# Patient Record
Sex: FEMALE | Race: WHITE | NOT HISPANIC OR LATINO | Employment: OTHER | URBAN - METROPOLITAN AREA
[De-identification: names, ages, dates, MRNs, and addresses within clinical notes are randomized per-mention and may not be internally consistent; named-entity substitution may affect disease eponyms.]

---

## 2023-04-26 LAB — URINE CULTURE: ABNORMAL

## 2023-04-28 LAB — URINE CULTURE: NORMAL

## 2023-05-02 LAB — URINE CULTURE: NORMAL

## 2023-07-26 LAB — URINE CULTURE: NORMAL

## 2023-10-31 ENCOUNTER — DOCUMENTATION (OUTPATIENT)
Dept: ORTHOPEDIC SURGERY | Facility: CLINIC | Age: 78
End: 2023-10-31
Payer: MEDICARE

## 2023-11-01 ENCOUNTER — OFFICE VISIT (OUTPATIENT)
Dept: ORTHOPEDIC SURGERY | Facility: CLINIC | Age: 78
End: 2023-11-01
Payer: MEDICARE

## 2023-11-01 VITALS — WEIGHT: 172 LBS | BODY MASS INDEX: 32.47 KG/M2 | HEIGHT: 61 IN

## 2023-11-01 DIAGNOSIS — S80.01XA CONTUSION OF RIGHT KNEE AND LOWER LEG, INITIAL ENCOUNTER: Primary | ICD-10-CM

## 2023-11-01 DIAGNOSIS — Z96.651 HISTORY OF TOTAL RIGHT KNEE REPLACEMENT: ICD-10-CM

## 2023-11-01 DIAGNOSIS — S80.11XA CONTUSION OF RIGHT KNEE AND LOWER LEG, INITIAL ENCOUNTER: Primary | ICD-10-CM

## 2023-11-01 PROCEDURE — 1159F MED LIST DOCD IN RCRD: CPT | Performed by: ORTHOPAEDIC SURGERY

## 2023-11-01 PROCEDURE — 1036F TOBACCO NON-USER: CPT | Performed by: ORTHOPAEDIC SURGERY

## 2023-11-01 PROCEDURE — 99213 OFFICE O/P EST LOW 20 MIN: CPT | Performed by: ORTHOPAEDIC SURGERY

## 2023-11-01 PROCEDURE — 1160F RVW MEDS BY RX/DR IN RCRD: CPT | Performed by: ORTHOPAEDIC SURGERY

## 2023-11-01 PROCEDURE — 1126F AMNT PAIN NOTED NONE PRSNT: CPT | Performed by: ORTHOPAEDIC SURGERY

## 2023-11-01 RX ORDER — AMOXICILLIN 500 MG/1
2000 TABLET, FILM COATED ORAL ONCE
Status: CANCELLED | OUTPATIENT
Start: 2023-11-01 | End: 2023-11-01

## 2023-11-01 ASSESSMENT — ENCOUNTER SYMPTOMS: KNEE SWELLING: 1

## 2023-11-01 NOTE — PROGRESS NOTES
Subjective    Patient ID: Leni Graham is a 78 y.o. female.    Chief Complaint: Pain of the Right Knee       78-year-old female who is a few years status post a right TKA.  The patient arrives today for evaluation of right knee pain as result of an injury that occurred on September 8, 2023 when she had a fall.  She feels she bumped her knee and noted some swelling and bruising which has now resolved.  Her main complaint now is actually some right shin and leg pain.  She does have a significant history of lumbar spine disease and is under the care of pain management.  She has had x-rays performed of her right knee on September 8 demonstrating the total knee arthroplasty remains well-positioned without evidence of component loosening.  I reviewed those films myself    This patient's past medical, social, and family history were reviewed as well as a review of systems including updates on the patient's information encounter sheet    Physical Examination  Constitutional: Patient's height and weight reviewed, appears well kempt  Psychiatric: Alert and oriented ×3, appropriate mood and behavior  Pulmonary: Breathing appears nonlabored, no apparent distress  Lymphatic: No appreciable lymphadenopathy to both the upper and lower extremities  Skin: No open lesions, rashes, ulcerations  Neurologic: Gross motor and sensory exam appear intact (except for abnormalities noted in the below muscle skeletal exam)    Musculoskeletal: There appears to be satisfactory range of motion of the right hip without groin or thigh pain elicited.  The right knee reveals no effusion or erythema or warmth.  The incision previously performed is well-healed.  There is no abrasion identified.  No ecchymosis identified.  Full active extension.  Flexion past 115 degrees.  Satisfactory patellofemoral tracking.  Satisfactory stability to varus and valgus stresses.    Assessment: Recent fall likely aggravating pre-existing lumbar spine disease    Plan:  Patient is going to continue with conservative management including observation and rest.  If she does not see improvement she may consider returning to pain management for reevaluation.    Right Knee         No current outpatient medications on file.

## 2023-11-02 ENCOUNTER — HOSPITAL ENCOUNTER (OUTPATIENT)
Dept: RADIOLOGY | Facility: EXTERNAL LOCATION | Age: 78
Discharge: HOME | End: 2023-11-02
Payer: MEDICARE

## 2023-12-04 ENCOUNTER — OFFICE VISIT (OUTPATIENT)
Dept: UROLOGY | Facility: CLINIC | Age: 78
End: 2023-12-04
Payer: MEDICARE

## 2023-12-04 VITALS — DIASTOLIC BLOOD PRESSURE: 70 MMHG | HEART RATE: 71 BPM | SYSTOLIC BLOOD PRESSURE: 151 MMHG

## 2023-12-04 DIAGNOSIS — N39.41 URGE URINARY INCONTINENCE: ICD-10-CM

## 2023-12-04 DIAGNOSIS — N95.2 VAGINAL ATROPHY: ICD-10-CM

## 2023-12-04 DIAGNOSIS — Z96.0 PRESENCE OF PESSARY: ICD-10-CM

## 2023-12-04 DIAGNOSIS — Z46.89 PESSARY MAINTENANCE: Primary | ICD-10-CM

## 2023-12-04 PROCEDURE — 99214 OFFICE O/P EST MOD 30 MIN: CPT | Performed by: OBSTETRICS & GYNECOLOGY

## 2023-12-04 PROCEDURE — 1036F TOBACCO NON-USER: CPT | Performed by: OBSTETRICS & GYNECOLOGY

## 2023-12-04 PROCEDURE — 99214 OFFICE O/P EST MOD 30 MIN: CPT | Mod: PN | Performed by: OBSTETRICS & GYNECOLOGY

## 2023-12-04 PROCEDURE — 1160F RVW MEDS BY RX/DR IN RCRD: CPT | Performed by: OBSTETRICS & GYNECOLOGY

## 2023-12-04 PROCEDURE — 1159F MED LIST DOCD IN RCRD: CPT | Performed by: OBSTETRICS & GYNECOLOGY

## 2023-12-04 PROCEDURE — 1126F AMNT PAIN NOTED NONE PRSNT: CPT | Performed by: OBSTETRICS & GYNECOLOGY

## 2023-12-04 RX ORDER — ESTRADIOL 0.1 MG/G
CREAM VAGINAL
Qty: 42.5 G | Refills: 3 | Status: SHIPPED | OUTPATIENT
Start: 2023-12-04 | End: 2024-04-16 | Stop reason: SDUPTHER

## 2023-12-04 NOTE — PATIENT INSTRUCTIONS
Please follow-up in March/April 2024 for pessary maintenance.    Please continue to utilize your vaginal estrogen therapy 2-3 times weekly.    Please contact the clinic with any questions or concerns.    [phone]

## 2023-12-04 NOTE — PROGRESS NOTES
Subjective   Patient ID: Leni Graham is a 78 y.o. female who presents for pessary maintenance.  HPI   78-year-old with atrophic vaginitis and uterine prolapse with urge urinary incontinence and #3 ring without support pessary in place.    The patient appears to be doing well, she denies any vaginal complaints, no abnormal bleeding or discharge.  She is utilizing the vaginal estrogen cream.    She denies any significant changes in her LUTS. She denies any UTI like symptoms.    She denies any bowel related complaints, no fecal or flatal incontinence.    She has no other complaints.    From Previous note  76-year-old presenting for follow-up with history of urge urinary incontinence and #3 ring pessary in place.     The patient feels very well controlled from a urinary urgency and urge related incontinence standpoint utilizing just her pessary. She has not utilized any anticholinergics due to cost and feels overall very well controlled. She notes 1-2 episode of nocturia and denies enuresis. She notes daytime frequency every 2-3 hours.     She does not desire any medical therapy at this time.     She denies any vaginal complaints. She is overall very satisfied with her pessary. She does require refill on her vaginal estrogen therapy.     She has no new complaints.     From previous note:  72-year-old  presenting as a referral from Dr. Celaya for concerns for chronic urinary tract infections.     Patient denies urge or stress urinary incontinence symptoms. She denies enuresis. She notes nocturia 1-2 times at night. She denies leaking urine with laughing coughing and sneezing. She denies dysuria or hematuria.     She has noted over the last 2 months a vaginal bulge when she wipes. She is not sexually active. She denies any vaginal bleeding.     The patient was recently hospitalized for intractable nausea and vomiting. During her hospital course she was noted to have blood and leuk esterase in her urine and was  treated empirically with antibiotics for a presumed urinary tract infection. A careful analysis of the last year of urinalyses and cultures demonstrate trace or negative blood with positive leukocytes and or leuk esterase. She denies any urinary urgency symptoms over the last 2 months and denies any urinary tract infection symptoms during her hospitalization stay.     She is working with Dr. Lubin for a prescription for her nausea complaints. Per her report a thorough workup while hospitalized was negative for these complaints.  Review of Systems  Constitutional: No fever, No chills and No fatigue.   Eyes: No vision problems and No dryness of the eyes.   ENT: No dry mouth, No hearing loss and No nosebleeds.   Cardiovascular: No chest pain, No palpitations and No orthopnea.   Respiratory: No shortness of breath, No cough and No wheezing.   Gastrointestinal: No abdominal pain, No constipation, No nausea, No diarrhea, No vomiting and No melena.   Genitourinary: As noted in HPI.   Musculoskeletal: No back pain, No myalgias, No muscle weakness, No joint swelling and No leg edema.   Integumentary: No rashes, No skin lesion and No itching.   Neurological: No headache, No numbness and No dizziness.   Psychiatric: No sleep disturbances, No anxiety and No depression.   Endocrine: No hot flashes, No loss of hair and No hirsutism.   Hematologic/Lymphatic: No swollen glands, No tendency for easy bleeding and No tendency for easy bruising.   All other systems have been reviewed and are negative for complaint.        Objective   Physical Exam    PHYSICAL EXAMINATION:  No LMP recorded.  There is no height or weight on file to calculate BMI.  /70   Pulse 71   General Appearance: well appearing  Neuro: Alert and oriented   HEENT: mucous membranes moist, neck supple  Resp: No respiratory distress, normal work of breathing  MSK: normal range of motion, gait appropriate    Patient ID: Leni Graham is a 78 y.o.  female.    Procedures  [facility] Procedure:  Pessary check.   Indication (s): pelvic organ prolapse.   Findings include: cystocele, uterine prolapse, atrophy and 3. Ring without support was removed and replaced without complication, but No tenderness and No excoriations.     Assessment/Plan      78-year-old with atrophic vaginitis and uterine prolapse with urge urinary incontinence and #3 ring without support pessary in place.     #1 we again discussed the [facility] care pathway including first, second, and third line therapies. We discussed fluid management strategies as well as benefits of pelvic floor physical therapy. The patient does have good pelvic floor strength and no pain. Trospium is cost prohibitive and she did not attempt this medication. She was previously prescribed solifenacin but did not fill this. She presently feels that she is well controlled from an urgency standpoint and does not desire any medication therapy. We have also previously discussed third line therapies including PTNS, Botox, and InterStim.      #2 she appears to be having excellent results with her pessary and wishes to continue this moving forward.  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 She appears to be having excellent results with her #3 ring without support. The patient will require follow-up in March/April 2024     #3 We discussed the safety, efficacy, proper utilization of vaginal estrogen therapy and she was provided a new prescription for this. She will continue this 3 times a week moving forward.  A new prescription was sent to her mail order pharmacy.     #4 the patient will follow up in March/April 2024 for pessary maintenance.     LIBORIO Rocha MD     Scribe Attestation  By signing my name below, Terra MALDONADO Scribe attest that this documentation has been prepared under the direction and in the presence of John KINNEY  MD Aj. All medical record entries made by the Scribe were at my direction or personally dictated by me. I have reviewed the chart and agree that the record accurately reflects my personal performance of the history, physical exam, discussion and plan.

## 2023-12-05 ENCOUNTER — APPOINTMENT (OUTPATIENT)
Dept: UROLOGY | Facility: CLINIC | Age: 78
End: 2023-12-05
Payer: MEDICARE

## 2023-12-28 ENCOUNTER — OFFICE VISIT (OUTPATIENT)
Dept: ORTHOPEDIC SURGERY | Facility: CLINIC | Age: 78
End: 2023-12-28
Payer: MEDICARE

## 2023-12-28 ENCOUNTER — ANCILLARY PROCEDURE (OUTPATIENT)
Dept: RADIOLOGY | Facility: CLINIC | Age: 78
End: 2023-12-28
Payer: MEDICARE

## 2023-12-28 VITALS — HEIGHT: 61 IN | WEIGHT: 172 LBS | BODY MASS INDEX: 32.47 KG/M2

## 2023-12-28 DIAGNOSIS — M25.512 ACUTE PAIN OF LEFT SHOULDER: ICD-10-CM

## 2023-12-28 DIAGNOSIS — M54.12 RADICULOPATHY, CERVICAL: Primary | ICD-10-CM

## 2023-12-28 DIAGNOSIS — M25.511 RIGHT SHOULDER PAIN: ICD-10-CM

## 2023-12-28 DIAGNOSIS — M25.511 ACUTE PAIN OF RIGHT SHOULDER: ICD-10-CM

## 2023-12-28 PROCEDURE — 1159F MED LIST DOCD IN RCRD: CPT | Performed by: ORTHOPAEDIC SURGERY

## 2023-12-28 PROCEDURE — 1036F TOBACCO NON-USER: CPT | Performed by: ORTHOPAEDIC SURGERY

## 2023-12-28 PROCEDURE — 73030 X-RAY EXAM OF SHOULDER: CPT | Mod: RIGHT SIDE | Performed by: RADIOLOGY

## 2023-12-28 PROCEDURE — 1126F AMNT PAIN NOTED NONE PRSNT: CPT | Performed by: ORTHOPAEDIC SURGERY

## 2023-12-28 PROCEDURE — 1160F RVW MEDS BY RX/DR IN RCRD: CPT | Performed by: ORTHOPAEDIC SURGERY

## 2023-12-28 PROCEDURE — 99214 OFFICE O/P EST MOD 30 MIN: CPT | Performed by: ORTHOPAEDIC SURGERY

## 2023-12-28 PROCEDURE — 73030 X-RAY EXAM OF SHOULDER: CPT | Mod: RT

## 2023-12-28 RX ORDER — METHYLPREDNISOLONE 4 MG/1
TABLET ORAL
Qty: 21 TABLET | Refills: 0 | Status: SHIPPED | OUTPATIENT
Start: 2023-12-28

## 2023-12-28 NOTE — PROGRESS NOTES
Subjective    Patient ID: Leni Graham is a 78 y.o. female.    Chief Complaint: Pain of the Right Shoulder       78-year-old female presents for evaluation of right shoulder and arm pain ongoing for the past 2 to 3 weeks.  She does describe the onset of this pain in relationship to a fall when she slipped out of a chair.  She has noted more discomfort with arm elevation but also pain in her cervical spine which radiates down her shoulder to include her upper arm and her forearm as well as her hand.  She has not noted much in the way of weakness.    This patient's past medical, social, and family history were reviewed as well as a review of systems including updates on the patient's information encounter sheet  Patient is not diabetic    Physical Examination  Constitutional: Patient's height and weight reviewed, appears well kempt  Psychiatric: Alert and oriented ×3, appropriate mood and behavior  Pulmonary: Breathing appears nonlabored, no apparent distress  Lymphatic: No appreciable lymphadenopathy to both the upper and lower extremities  Skin: No open lesions, rashes, ulcerations  Neurologic: Gross motor and sensory exam appear intact (except for abnormalities noted in the below muscle skeletal exam)    Musculoskeletal: There appears to be satisfactory passive and active range of motion of the right shoulder with only slight pain elicited.  There is full strength of internal rotation and external rotation of the right shoulder as well as abduction.  There is recreation of the pain with extension of the cervical spine and rotation to the left recreating pain down the right arm.    X-rays performed today of the right shoulder reveal the glenohumeral joint is well reduced.  There is no evidence of a fracture.  Moderate osteopenia    Assessment: Symptoms consistent with cervical radiculopathy    Plan: A long discussion ensued with the patient treatment options.  We discussed the options for physical therapy but she  does not wish to consider physical therapy.  We discussed the options for Medrol Dosepak which she would like to try.  If she does not see improvement with this she will consider evaluation either through pain management or spine specialist.    Right Shoulder           Current Outpatient Medications:     estradiol (Estrace) 0.01 % (0.1 mg/gram) vaginal cream, Place a dime sized amount vaginally for 2 weeks then 3 times a week thereafter., Disp: 42.5 g, Rfl: 3

## 2024-03-06 ENCOUNTER — LAB (OUTPATIENT)
Dept: LAB | Facility: LAB | Age: 79
End: 2024-03-06
Payer: MEDICARE

## 2024-03-06 ENCOUNTER — OFFICE VISIT (OUTPATIENT)
Dept: GASTROENTEROLOGY | Facility: CLINIC | Age: 79
End: 2024-03-06
Payer: MEDICARE

## 2024-03-06 VITALS — BODY MASS INDEX: 32.28 KG/M2 | HEART RATE: 71 BPM | WEIGHT: 171 LBS | HEIGHT: 61 IN

## 2024-03-06 DIAGNOSIS — K29.00 ACUTE GASTRITIS WITHOUT HEMORRHAGE, UNSPECIFIED GASTRITIS TYPE: ICD-10-CM

## 2024-03-06 DIAGNOSIS — R11.12 PROJECTILE VOMITING WITH NAUSEA: ICD-10-CM

## 2024-03-06 DIAGNOSIS — K29.00 ACUTE GASTRITIS WITHOUT HEMORRHAGE, UNSPECIFIED GASTRITIS TYPE: Primary | ICD-10-CM

## 2024-03-06 LAB
ALBUMIN SERPL BCP-MCNC: 4.1 G/DL (ref 3.4–5)
ALP SERPL-CCNC: 102 U/L (ref 33–136)
ALT SERPL W P-5'-P-CCNC: 10 U/L (ref 7–45)
ANION GAP SERPL CALC-SCNC: 15 MMOL/L (ref 10–20)
AST SERPL W P-5'-P-CCNC: 15 U/L (ref 9–39)
BASOPHILS # BLD AUTO: 0.05 X10*3/UL (ref 0–0.1)
BASOPHILS NFR BLD AUTO: 0.7 %
BILIRUB SERPL-MCNC: 0.5 MG/DL (ref 0–1.2)
BUN SERPL-MCNC: 12 MG/DL (ref 6–23)
CALCIUM SERPL-MCNC: 9.9 MG/DL (ref 8.6–10.6)
CHLORIDE SERPL-SCNC: 101 MMOL/L (ref 98–107)
CO2 SERPL-SCNC: 28 MMOL/L (ref 21–32)
CREAT SERPL-MCNC: 0.7 MG/DL (ref 0.5–1.05)
EGFRCR SERPLBLD CKD-EPI 2021: 88 ML/MIN/1.73M*2
EOSINOPHIL # BLD AUTO: 0.34 X10*3/UL (ref 0–0.4)
EOSINOPHIL NFR BLD AUTO: 5 %
ERYTHROCYTE [DISTWIDTH] IN BLOOD BY AUTOMATED COUNT: 15.2 % (ref 11.5–14.5)
GLUCOSE SERPL-MCNC: 88 MG/DL (ref 74–99)
HCT VFR BLD AUTO: 42.3 % (ref 36–46)
HGB BLD-MCNC: 13.3 G/DL (ref 12–16)
IMM GRANULOCYTES # BLD AUTO: 0.01 X10*3/UL (ref 0–0.5)
IMM GRANULOCYTES NFR BLD AUTO: 0.1 % (ref 0–0.9)
LIPASE SERPL-CCNC: 43 U/L (ref 9–82)
LYMPHOCYTES # BLD AUTO: 1.99 X10*3/UL (ref 0.8–3)
LYMPHOCYTES NFR BLD AUTO: 29.2 %
MCH RBC QN AUTO: 29.4 PG (ref 26–34)
MCHC RBC AUTO-ENTMCNC: 31.4 G/DL (ref 32–36)
MCV RBC AUTO: 94 FL (ref 80–100)
MONOCYTES # BLD AUTO: 0.42 X10*3/UL (ref 0.05–0.8)
MONOCYTES NFR BLD AUTO: 6.2 %
NEUTROPHILS # BLD AUTO: 4.01 X10*3/UL (ref 1.6–5.5)
NEUTROPHILS NFR BLD AUTO: 58.8 %
NRBC BLD-RTO: 0 /100 WBCS (ref 0–0)
PLATELET # BLD AUTO: 269 X10*3/UL (ref 150–450)
POTASSIUM SERPL-SCNC: 4.6 MMOL/L (ref 3.5–5.3)
PROT SERPL-MCNC: 6.6 G/DL (ref 6.4–8.2)
RBC # BLD AUTO: 4.52 X10*6/UL (ref 4–5.2)
SODIUM SERPL-SCNC: 139 MMOL/L (ref 136–145)
WBC # BLD AUTO: 6.8 X10*3/UL (ref 4.4–11.3)

## 2024-03-06 PROCEDURE — 83690 ASSAY OF LIPASE: CPT

## 2024-03-06 PROCEDURE — 1159F MED LIST DOCD IN RCRD: CPT | Performed by: INTERNAL MEDICINE

## 2024-03-06 PROCEDURE — 1126F AMNT PAIN NOTED NONE PRSNT: CPT | Performed by: INTERNAL MEDICINE

## 2024-03-06 PROCEDURE — 85025 COMPLETE CBC W/AUTO DIFF WBC: CPT

## 2024-03-06 PROCEDURE — 99214 OFFICE O/P EST MOD 30 MIN: CPT | Performed by: INTERNAL MEDICINE

## 2024-03-06 PROCEDURE — 80053 COMPREHEN METABOLIC PANEL: CPT

## 2024-03-06 PROCEDURE — 36415 COLL VENOUS BLD VENIPUNCTURE: CPT

## 2024-03-06 PROCEDURE — 1036F TOBACCO NON-USER: CPT | Performed by: INTERNAL MEDICINE

## 2024-03-06 PROCEDURE — 1157F ADVNC CARE PLAN IN RCRD: CPT | Performed by: INTERNAL MEDICINE

## 2024-03-06 RX ORDER — ASPIRIN 81 MG/1
81 TABLET ORAL DAILY
COMMUNITY

## 2024-03-06 RX ORDER — FLUTICASONE PROPIONATE AND SALMETEROL 100; 50 UG/1; UG/1
1 POWDER RESPIRATORY (INHALATION) 2 TIMES DAILY
COMMUNITY
Start: 2023-11-28

## 2024-03-06 RX ORDER — OMEPRAZOLE 20 MG/1
20 CAPSULE, DELAYED RELEASE ORAL DAILY
COMMUNITY

## 2024-03-06 ASSESSMENT — ENCOUNTER SYMPTOMS
GASTROINTESTINAL NEGATIVE: 1
RESPIRATORY NEGATIVE: 1
CONSTITUTIONAL NEGATIVE: 1
PSYCHIATRIC NEGATIVE: 1
OCCASIONAL FEELINGS OF UNSTEADINESS: 0
DEPRESSION: 0
ALLERGIC/IMMUNOLOGIC NEGATIVE: 1
CARDIOVASCULAR NEGATIVE: 1
HEMATOLOGIC/LYMPHATIC NEGATIVE: 1
EYES NEGATIVE: 1
LOSS OF SENSATION IN FEET: 0
MUSCULOSKELETAL NEGATIVE: 1
ENDOCRINE NEGATIVE: 1
NEUROLOGICAL NEGATIVE: 1

## 2024-03-06 NOTE — ASSESSMENT & PLAN NOTE
The patient is a 78-year-old with a history gastroesophageal reflux and gastritis.  She currently is having some cervical disc disease who has not been taking Motrin regularly.  She was trying to get off her Prilosec.  Last evening she had an episode of nausea and vomiting and has some mild tenderness in her abdomen.  She has had normal bowel movements and tolerated oatmeal this morning.  I wonder could she have actual gastritis and we will do blood work.  I have asked her to increase her Prilosec to 40 mg daily for at least a month.  I will call her with labs when available.   Detail Level: Detailed

## 2024-03-06 NOTE — PROGRESS NOTES
Nausea and vomiting    History of Present Illness:   Leni Graham is a 78 y.o. female with PMHx of lymphocytic colitis and asthma who presents to GI clinic for follow up.  She presents for an acute visit due to 1 episode of nausea and vomiting last evening and dinner.  She denies hematemesis, coffee-ground emesis, black stool or bright red blood per rectum.  Has mild sensitivity in her stomach today.  She did tolerate oatmeal.    She had an endoscopy 2017 showing gastritis.    She had colonoscopy 2015 showing lymphocytic colitis is no steroids.    She has no other complaints today.        Review of Systems  Review of Systems   Constitutional: Negative.    HENT: Negative.     Eyes: Negative.    Respiratory: Negative.     Cardiovascular: Negative.    Gastrointestinal: Negative.    Endocrine: Negative.    Genitourinary: Negative.    Musculoskeletal: Negative.    Skin: Negative.    Allergic/Immunologic: Negative.    Neurological: Negative.    Hematological: Negative.    Psychiatric/Behavioral: Negative.     All other systems reviewed and are negative.      Allergies  Allergies   Allergen Reactions    Acetaminophen Nausea/vomiting    Adhesive Tape-Silicones Unknown    Duloxetine Unknown    Ezetimibe Unknown    Meloxicam Swelling    Nitrofurantoin Unknown    Nitrofurantoin Monohyd/M-Cryst Diarrhea and Nausea/vomiting    Nortriptyline Other and Unknown    Oxcarbazepine Nausea Only, Other and Unknown     Sodium levels dropped    Oxycodone-Acetaminophen Unknown and Nausea/vomiting    Pitavastatin Other and Unknown    Pseudoephedrine Unknown    Simvastatin Other and Unknown    Statins-Hmg-Coa Reductase Inhibitors Other and Unknown    Sulfa (Sulfonamide Antibiotics) GI Upset    Sulfamethoxazole-Trimethoprim Nausea/vomiting    Tramadol GI Upset and Unknown     Nausea, shaking, severe headache    Cephalexin Rash and Unknown     rash    Diphenhydramine Rash and Swelling     Swelling in feet    Levofloxacin Rash        Medications  Current Outpatient Medications   Medication Instructions    aspirin 81 mg, oral, Daily    estradiol (Estrace) 0.01 % (0.1 mg/gram) vaginal cream Place a dime sized amount vaginally for 2 weeks then 3 times a week thereafter.    fluticasone propion-salmeteroL (Advair Diskus) 100-50 mcg/dose diskus inhaler 1 puff, inhalation, 2 times daily    methylPREDNISolone (Medrol Dospak) 4 mg tablets Use as directed by package instructions    omeprazole (PRILOSEC) 20 mg, oral, Daily        Objective   Visit Vitals  Pulse 71      Physical Exam  Constitutional:       Appearance: Normal appearance.   Eyes:      Conjunctiva/sclera: Conjunctivae normal.   Cardiovascular:      Rate and Rhythm: Normal rate and regular rhythm.   Pulmonary:      Effort: Pulmonary effort is normal.      Breath sounds: Normal breath sounds.   Abdominal:      General: Abdomen is flat. Bowel sounds are normal.      Palpations: Abdomen is soft.   Musculoskeletal:         General: Normal range of motion.      Cervical back: Normal range of motion.   Neurological:      Mental Status: She is alert.           Lab Results   Component Value Date    WBC 5.8 04/13/2022    WBC 6.6 11/08/2021    WBC 5.0 09/30/2020    HGB 14.0 04/13/2022    HGB 13.0 11/08/2021    HGB 13.4 09/30/2020    HCT 43.7 04/13/2022    HCT 42.8 11/08/2021    HCT 42.6 09/30/2020     04/13/2022     11/08/2021     09/30/2020     Lab Results   Component Value Date     04/13/2022     11/08/2021     08/16/2021    K 4.6 04/13/2022    K 4.5 11/08/2021    K 4.4 08/16/2021    CL 98 04/13/2022     11/08/2021     08/16/2021    CO2 28 04/13/2022    CO2 26 11/08/2021    CO2 30 08/16/2021    BUN 12 09/29/2022    BUN 17 04/13/2022    BUN 17 11/08/2021    CREATININE 0.71 09/29/2022    CREATININE 0.75 04/13/2022    CREATININE 0.78 11/08/2021    CALCIUM 9.9 04/13/2022    CALCIUM 9.0 11/08/2021    CALCIUM 9.6 08/16/2021    PROT 6.1 (L) 11/08/2021     PROT 6.2 (L) 09/30/2020    PROT 6.0 (L) 05/29/2020    BILITOT 0.4 11/08/2021    BILITOT 0.5 09/30/2020    BILITOT 0.5 05/29/2020    ALKPHOS 94 11/08/2021    ALKPHOS 115 09/30/2020    ALKPHOS 95 05/29/2020    ALT 14 11/08/2021    ALT 10 09/30/2020    ALT 9 05/29/2020    AST 18 11/08/2021    AST 14 09/30/2020    AST 15 05/29/2020    GLUCOSE 88 04/13/2022    GLUCOSE 86 11/08/2021    GLUCOSE 90 08/16/2021           Leni Graham is a 78 y.o. female who presents to GI clinic for nausea and vomiting in the setting of probable gastritis from NSAIDs..    Acute gastritis without hemorrhage  The patient is a 78-year-old with a history gastroesophageal reflux and gastritis.  She currently is having some cervical disc disease who has not been taking Motrin regularly.  She was trying to get off her Prilosec.  Last evening she had an episode of nausea and vomiting and has some mild tenderness in her abdomen.  She has had normal bowel movements and tolerated oatmeal this morning.  I wonder could she have actual gastritis and we will do blood work.  I have asked her to increase her Prilosec to 40 mg daily for at least a month.  I will call her with labs when available.    Projectile vomiting with nausea  See discussion above on nausea and vomiting.         Channing Lewis MD

## 2024-03-07 ENCOUNTER — TELEPHONE (OUTPATIENT)
Dept: GASTROENTEROLOGY | Facility: CLINIC | Age: 79
End: 2024-03-07
Payer: MEDICARE

## 2024-03-07 NOTE — TELEPHONE ENCOUNTER
Reviewed the labs that show no evidence of liver function test abnormality pancreas abnormality or blood counts.  She will continue her current regimen.

## 2024-03-28 PROBLEM — R29.898 WEAKNESS OF BOTH HIPS: Status: ACTIVE | Noted: 2023-09-28

## 2024-03-28 PROBLEM — L57.0 ACTINIC KERATOSIS: Status: ACTIVE | Noted: 2018-12-09

## 2024-03-28 PROBLEM — G92.9 TOXIC ENCEPHALOPATHY: Status: ACTIVE | Noted: 2017-11-16

## 2024-03-28 PROBLEM — S80.10XA CONTUSION OF KNEE AND LOWER LEG: Status: ACTIVE | Noted: 2024-03-28

## 2024-03-28 PROBLEM — K44.9 HIATAL HERNIA: Status: ACTIVE | Noted: 2024-03-28

## 2024-03-28 PROBLEM — L71.9 ROSACEA: Status: ACTIVE | Noted: 2022-06-20

## 2024-03-28 PROBLEM — E55.9 VITAMIN D INSUFFICIENCY: Status: ACTIVE | Noted: 2024-03-28

## 2024-03-28 PROBLEM — H04.129 DRY EYE SYNDROME: Status: ACTIVE | Noted: 2024-03-28

## 2024-03-28 PROBLEM — R32 ENURESIS: Status: ACTIVE | Noted: 2024-03-28

## 2024-03-28 PROBLEM — R06.02 SHORTNESS OF BREATH ON EXERTION: Status: ACTIVE | Noted: 2024-03-28

## 2024-03-28 PROBLEM — L82.1 SEBORRHEIC KERATOSIS: Status: ACTIVE | Noted: 2018-12-09

## 2024-03-28 PROBLEM — R74.01 TRANSAMINITIS: Status: ACTIVE | Noted: 2023-07-12

## 2024-03-28 PROBLEM — K21.9 GASTROESOPHAGEAL REFLUX DISEASE: Status: ACTIVE | Noted: 2018-10-02

## 2024-03-28 PROBLEM — R41.89 SUBJECTIVE MEMORY COMPLAINTS: Status: ACTIVE | Noted: 2024-03-28

## 2024-03-28 PROBLEM — K29.00 ACUTE GASTRITIS: Status: ACTIVE | Noted: 2024-03-06

## 2024-03-28 PROBLEM — R79.89 ELEVATED LFTS: Status: ACTIVE | Noted: 2023-07-04

## 2024-03-28 PROBLEM — N39.0 UTI (URINARY TRACT INFECTION): Status: ACTIVE | Noted: 2023-07-04

## 2024-03-28 PROBLEM — R30.0 DYSURIA: Status: ACTIVE | Noted: 2018-10-02

## 2024-03-28 PROBLEM — Z96.0 VAGINAL PESSARY PRESENT: Status: ACTIVE | Noted: 2024-03-28

## 2024-03-28 PROBLEM — S80.00XA CONTUSION OF KNEE AND LOWER LEG: Status: ACTIVE | Noted: 2024-03-28

## 2024-03-28 PROBLEM — F32.A DEPRESSION: Status: ACTIVE | Noted: 2023-07-04

## 2024-03-28 PROBLEM — H01.134 ECZEMATOUS DERMATITIS OF LEFT UPPER EYELID: Status: ACTIVE | Noted: 2021-10-28

## 2024-03-28 PROBLEM — R26.2 DIFFICULTY WALKING: Status: ACTIVE | Noted: 2023-09-28

## 2024-03-28 PROBLEM — M70.61 TROCHANTERIC BURSITIS OF RIGHT HIP: Status: ACTIVE | Noted: 2024-03-28

## 2024-03-28 PROBLEM — M79.89 SWELLING OF LEFT LOWER EXTREMITY: Status: ACTIVE | Noted: 2024-03-28

## 2024-03-28 PROBLEM — R05.9 COUGH: Status: ACTIVE | Noted: 2024-03-28

## 2024-04-16 ENCOUNTER — OFFICE VISIT (OUTPATIENT)
Dept: UROLOGY | Facility: CLINIC | Age: 79
End: 2024-04-16
Payer: MEDICARE

## 2024-04-16 VITALS
BODY MASS INDEX: 33.86 KG/M2 | DIASTOLIC BLOOD PRESSURE: 71 MMHG | SYSTOLIC BLOOD PRESSURE: 128 MMHG | WEIGHT: 176.3 LBS | HEART RATE: 65 BPM

## 2024-04-16 DIAGNOSIS — Z96.0 PRESENCE OF PESSARY: ICD-10-CM

## 2024-04-16 DIAGNOSIS — N39.41 URGE URINARY INCONTINENCE: ICD-10-CM

## 2024-04-16 DIAGNOSIS — Z46.89 PESSARY MAINTENANCE: ICD-10-CM

## 2024-04-16 DIAGNOSIS — N95.2 VAGINAL ATROPHY: Primary | ICD-10-CM

## 2024-04-16 PROCEDURE — 99214 OFFICE O/P EST MOD 30 MIN: CPT | Performed by: OBSTETRICS & GYNECOLOGY

## 2024-04-16 PROCEDURE — 1160F RVW MEDS BY RX/DR IN RCRD: CPT | Performed by: OBSTETRICS & GYNECOLOGY

## 2024-04-16 PROCEDURE — 1159F MED LIST DOCD IN RCRD: CPT | Performed by: OBSTETRICS & GYNECOLOGY

## 2024-04-16 PROCEDURE — 1036F TOBACCO NON-USER: CPT | Performed by: OBSTETRICS & GYNECOLOGY

## 2024-04-16 PROCEDURE — 1157F ADVNC CARE PLAN IN RCRD: CPT | Performed by: OBSTETRICS & GYNECOLOGY

## 2024-04-16 RX ORDER — ESTRADIOL 0.1 MG/G
CREAM VAGINAL
Qty: 42.5 G | Refills: 3 | Status: SHIPPED | OUTPATIENT
Start: 2024-04-16

## 2024-04-16 NOTE — PATIENT INSTRUCTIONS
Please follow-up in August/September 2024 for pessary maintenance.    Please continue to utilize your vaginal estrogen therapy 2-3 times weekly.    Please contact the clinic with any questions or concerns.    [phone]

## 2024-04-16 NOTE — PROGRESS NOTES
Subjective   Patient ID: Leni Graham is a 78 y.o. female who presents for pessary maintenance.  HPI   78-year-old with atrophic vaginitis and uterine prolapse with urge urinary incontinence and #3 ring without support pessary in place.     The patient appears to be doing well, she denies any vaginal complaints, no abnormal bleeding or discharge.  She is utilizing the vaginal estrogen cream.    She denies any significant changes in her LUTS. She denies any UTI like symptoms.    She denies any bowel related complaints, no fecal or flatal incontinence.    She has no other complaints.    From Previous note   78-year-old with atrophic vaginitis and uterine prolapse with urge urinary incontinence and #3 ring without support pessary in place.    The patient appears to be doing well, she denies any vaginal complaints, no abnormal bleeding or discharge.  She is utilizing the vaginal estrogen cream.    She denies any significant changes in her LUTS. She denies any UTI like symptoms.    She denies any bowel related complaints, no fecal or flatal incontinence.    She has no other complaints.    From Previous note  76-year-old presenting for follow-up with history of urge urinary incontinence and #3 ring pessary in place.     The patient feels very well controlled from a urinary urgency and urge related incontinence standpoint utilizing just her pessary. She has not utilized any anticholinergics due to cost and feels overall very well controlled. She notes 1-2 episode of nocturia and denies enuresis. She notes daytime frequency every 2-3 hours.     She does not desire any medical therapy at this time.     She denies any vaginal complaints. She is overall very satisfied with her pessary. She does require refill on her vaginal estrogen therapy.     She has no new complaints.     From previous note:  72-year-old  presenting as a referral from Dr. Celaya for concerns for chronic urinary tract infections.     Patient denies  urge or stress urinary incontinence symptoms. She denies enuresis. She notes nocturia 1-2 times at night. She denies leaking urine with laughing coughing and sneezing. She denies dysuria or hematuria.     She has noted over the last 2 months a vaginal bulge when she wipes. She is not sexually active. She denies any vaginal bleeding.     The patient was recently hospitalized for intractable nausea and vomiting. During her hospital course she was noted to have blood and leuk esterase in her urine and was treated empirically with antibiotics for a presumed urinary tract infection. A careful analysis of the last year of urinalyses and cultures demonstrate trace or negative blood with positive leukocytes and or leuk esterase. She denies any urinary urgency symptoms over the last 2 months and denies any urinary tract infection symptoms during her hospitalization stay.     She is working with Dr. Lubin for a prescription for her nausea complaints. Per her report a thorough workup while hospitalized was negative for these complaints.  Review of Systems  Constitutional: No fever, No chills and No fatigue.   Eyes: No vision problems and No dryness of the eyes.   ENT: No dry mouth, No hearing loss and No nosebleeds.   Cardiovascular: No chest pain, No palpitations and No orthopnea.   Respiratory: No shortness of breath, No cough and No wheezing.   Gastrointestinal: No abdominal pain, No constipation, No nausea, No diarrhea, No vomiting and No melena.   Genitourinary: As noted in HPI.   Musculoskeletal: No back pain, No myalgias, No muscle weakness, No joint swelling and No leg edema.   Integumentary: No rashes, No skin lesion and No itching.   Neurological: No headache, No numbness and No dizziness.   Psychiatric: No sleep disturbances, No anxiety and No depression.   Endocrine: No hot flashes, No loss of hair and No hirsutism.   Hematologic/Lymphatic: No swollen glands, No tendency for easy bleeding and No tendency for easy  bruising.   All other systems have been reviewed and are negative for complaint.        Objective   Physical Exam    PHYSICAL EXAMINATION:  No LMP recorded.  There is no height or weight on file to calculate BMI.  There were no vitals taken for this visit.  General Appearance: well appearing  Neuro: Alert and oriented   HEENT: mucous membranes moist, neck supple  Resp: No respiratory distress, normal work of breathing  MSK: normal range of motion, gait appropriate    Patient ID: Leni Graham is a 78 y.o. female.    Procedures  [facility] Procedure:  Pessary check.   Indication (s): pelvic organ prolapse.   Findings include: cystocele, uterine prolapse, atrophy and 3. Ring without support was removed and replaced without complication, but No tenderness and No excoriations.     Assessment/Plan      78-year-old with atrophic vaginitis and uterine prolapse with urge urinary incontinence and #3 ring without support pessary in place.     #1 we again discussed the [facility] care pathway including first, second, and third line therapies. We discussed fluid management strategies as well as benefits of pelvic floor physical therapy. The patient does have good pelvic floor strength and no pain. Trospium is cost prohibitive and she did not attempt this medication. She was previously prescribed solifenacin but did not fill this. She presently feels that she is well controlled from an urgency standpoint and does not desire any medication therapy. We have also previously discussed third line therapies including PTNS, Botox, and InterStim.      #2 she appears to be having excellent results with her pessary and wishes to continue this moving forward.  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 She appears to be having excellent results with her #3 ring without support. The patient will require follow-up in August/September 2024     #3 We discussed the  safety, efficacy, proper utilization of vaginal estrogen therapy and she was provided a new prescription for this. She will continue this 3 times a week moving forward.  A new prescription was sent to her mail order pharmacy.     #4 the patient will follow up in August/September 2024 for pessary maintenance.     LIBORIO Rocha MD     Scribe Attestation  By signing my name below, I Terralaron Kwan, Scribe attest that this documentation has been prepared under the direction and in the presence of John Rocha MD. All medical record entries made by the Scribe were at my direction or personally dictated by me. I have reviewed the chart and agree that the record accurately reflects my personal performance of the history, physical exam, discussion and plan.

## 2024-05-16 ENCOUNTER — TELEPHONE (OUTPATIENT)
Dept: GASTROENTEROLOGY | Facility: CLINIC | Age: 79
End: 2024-05-16
Payer: MEDICARE

## 2024-05-16 NOTE — TELEPHONE ENCOUNTER
----- Message from Katina Gregorio MA sent at 5/16/2024  9:28 AM EDT -----  Regarding: Omperazole  Patient says you put her on Omeprazole about a month ago.  Says it effected her memory so she stopped taking it and now is having heartburn again.    [phone]        Patient will try Pepcid AC as she is worried about the proton pump inhibitors is causing memory issues.

## 2024-05-17 ENCOUNTER — APPOINTMENT (OUTPATIENT)
Dept: RADIOLOGY | Facility: HOSPITAL | Age: 79
End: 2024-05-17
Payer: MEDICARE

## 2024-05-17 ENCOUNTER — HOSPITAL ENCOUNTER (EMERGENCY)
Facility: HOSPITAL | Age: 79
Discharge: HOME | End: 2024-05-17
Attending: EMERGENCY MEDICINE
Payer: MEDICARE

## 2024-05-17 ENCOUNTER — APPOINTMENT (OUTPATIENT)
Dept: CARDIOLOGY | Facility: HOSPITAL | Age: 79
End: 2024-05-17
Payer: MEDICARE

## 2024-05-17 VITALS
RESPIRATION RATE: 16 BRPM | HEIGHT: 61 IN | WEIGHT: 175 LBS | HEART RATE: 65 BPM | BODY MASS INDEX: 33.04 KG/M2 | OXYGEN SATURATION: 95 % | SYSTOLIC BLOOD PRESSURE: 137 MMHG | DIASTOLIC BLOOD PRESSURE: 78 MMHG | TEMPERATURE: 97.5 F

## 2024-05-17 DIAGNOSIS — R07.89 ATYPICAL CHEST PAIN: Primary | ICD-10-CM

## 2024-05-17 LAB
ALBUMIN SERPL BCP-MCNC: 3.8 G/DL (ref 3.4–5)
ALP SERPL-CCNC: 97 U/L (ref 33–136)
ALT SERPL W P-5'-P-CCNC: 9 U/L (ref 7–45)
ANION GAP SERPL CALC-SCNC: 13 MMOL/L (ref 10–20)
AST SERPL W P-5'-P-CCNC: 12 U/L (ref 9–39)
BASOPHILS # BLD AUTO: 0.04 X10*3/UL (ref 0–0.1)
BASOPHILS NFR BLD AUTO: 0.6 %
BILIRUB SERPL-MCNC: 0.6 MG/DL (ref 0–1.2)
BUN SERPL-MCNC: 13 MG/DL (ref 6–23)
CALCIUM SERPL-MCNC: 9.3 MG/DL (ref 8.6–10.3)
CARDIAC TROPONIN I PNL SERPL HS: 4 NG/L (ref 0–13)
CARDIAC TROPONIN I PNL SERPL HS: 4 NG/L (ref 0–13)
CHLORIDE SERPL-SCNC: 100 MMOL/L (ref 98–107)
CO2 SERPL-SCNC: 27 MMOL/L (ref 21–32)
CREAT SERPL-MCNC: 0.71 MG/DL (ref 0.5–1.05)
EGFRCR SERPLBLD CKD-EPI 2021: 87 ML/MIN/1.73M*2
EOSINOPHIL # BLD AUTO: 0.27 X10*3/UL (ref 0–0.4)
EOSINOPHIL NFR BLD AUTO: 4.2 %
ERYTHROCYTE [DISTWIDTH] IN BLOOD BY AUTOMATED COUNT: 14.4 % (ref 11.5–14.5)
GLUCOSE SERPL-MCNC: 99 MG/DL (ref 74–99)
HCT VFR BLD AUTO: 38.9 % (ref 36–46)
HGB BLD-MCNC: 12.8 G/DL (ref 12–16)
IMM GRANULOCYTES # BLD AUTO: 0.01 X10*3/UL (ref 0–0.5)
IMM GRANULOCYTES NFR BLD AUTO: 0.2 % (ref 0–0.9)
INR PPP: 1.1 (ref 0.9–1.1)
LYMPHOCYTES # BLD AUTO: 1.51 X10*3/UL (ref 0.8–3)
LYMPHOCYTES NFR BLD AUTO: 23.3 %
MAGNESIUM SERPL-MCNC: 1.91 MG/DL (ref 1.6–2.4)
MCH RBC QN AUTO: 30 PG (ref 26–34)
MCHC RBC AUTO-ENTMCNC: 32.9 G/DL (ref 32–36)
MCV RBC AUTO: 91 FL (ref 80–100)
MONOCYTES # BLD AUTO: 0.4 X10*3/UL (ref 0.05–0.8)
MONOCYTES NFR BLD AUTO: 6.2 %
NEUTROPHILS # BLD AUTO: 4.25 X10*3/UL (ref 1.6–5.5)
NEUTROPHILS NFR BLD AUTO: 65.5 %
NRBC BLD-RTO: 0 /100 WBCS (ref 0–0)
PLATELET # BLD AUTO: 239 X10*3/UL (ref 150–450)
POTASSIUM SERPL-SCNC: 4 MMOL/L (ref 3.5–5.3)
PROT SERPL-MCNC: 6.2 G/DL (ref 6.4–8.2)
PROTHROMBIN TIME: 11.9 SECONDS (ref 9.8–12.8)
RBC # BLD AUTO: 4.27 X10*6/UL (ref 4–5.2)
SODIUM SERPL-SCNC: 136 MMOL/L (ref 136–145)
WBC # BLD AUTO: 6.5 X10*3/UL (ref 4.4–11.3)

## 2024-05-17 PROCEDURE — 85610 PROTHROMBIN TIME: CPT | Performed by: EMERGENCY MEDICINE

## 2024-05-17 PROCEDURE — 71045 X-RAY EXAM CHEST 1 VIEW: CPT | Mod: FOREIGN READ | Performed by: RADIOLOGY

## 2024-05-17 PROCEDURE — 71045 X-RAY EXAM CHEST 1 VIEW: CPT

## 2024-05-17 PROCEDURE — 84075 ASSAY ALKALINE PHOSPHATASE: CPT | Performed by: EMERGENCY MEDICINE

## 2024-05-17 PROCEDURE — 36415 COLL VENOUS BLD VENIPUNCTURE: CPT | Performed by: EMERGENCY MEDICINE

## 2024-05-17 PROCEDURE — 84484 ASSAY OF TROPONIN QUANT: CPT | Performed by: EMERGENCY MEDICINE

## 2024-05-17 PROCEDURE — 85025 COMPLETE CBC W/AUTO DIFF WBC: CPT | Performed by: EMERGENCY MEDICINE

## 2024-05-17 PROCEDURE — 83735 ASSAY OF MAGNESIUM: CPT | Performed by: EMERGENCY MEDICINE

## 2024-05-17 PROCEDURE — 93005 ELECTROCARDIOGRAM TRACING: CPT

## 2024-05-17 PROCEDURE — 99283 EMERGENCY DEPT VISIT LOW MDM: CPT | Mod: 25 | Performed by: EMERGENCY MEDICINE

## 2024-05-17 PROCEDURE — 2500000001 HC RX 250 WO HCPCS SELF ADMINISTERED DRUGS (ALT 637 FOR MEDICARE OP): Performed by: EMERGENCY MEDICINE

## 2024-05-17 RX ORDER — LIDOCAINE HYDROCHLORIDE 20 MG/ML
15 SOLUTION OROPHARYNGEAL ONCE
Status: COMPLETED | OUTPATIENT
Start: 2024-05-17 | End: 2024-05-17

## 2024-05-17 RX ORDER — ALUMINUM HYDROXIDE, MAGNESIUM HYDROXIDE, AND SIMETHICONE 1200; 120; 1200 MG/30ML; MG/30ML; MG/30ML
30 SUSPENSION ORAL ONCE
Status: COMPLETED | OUTPATIENT
Start: 2024-05-17 | End: 2024-05-17

## 2024-05-17 RX ADMIN — ALUMINUM HYDROXIDE, MAGNESIUM HYDROXIDE, AND SIMETHICONE 30 ML: 1200; 120; 1200 SUSPENSION ORAL at 14:49

## 2024-05-17 RX ADMIN — LIDOCAINE HYDROCHLORIDE 15 ML: 20 SOLUTION ORAL at 13:46

## 2024-05-17 ASSESSMENT — LIFESTYLE VARIABLES
EVER FELT BAD OR GUILTY ABOUT YOUR DRINKING: NO
HAVE PEOPLE ANNOYED YOU BY CRITICIZING YOUR DRINKING: NO
TOTAL SCORE: 0
HAVE YOU EVER FELT YOU SHOULD CUT DOWN ON YOUR DRINKING: NO
EVER HAD A DRINK FIRST THING IN THE MORNING TO STEADY YOUR NERVES TO GET RID OF A HANGOVER: NO

## 2024-05-17 ASSESSMENT — COLUMBIA-SUICIDE SEVERITY RATING SCALE - C-SSRS
2. HAVE YOU ACTUALLY HAD ANY THOUGHTS OF KILLING YOURSELF?: NO
1. IN THE PAST MONTH, HAVE YOU WISHED YOU WERE DEAD OR WISHED YOU COULD GO TO SLEEP AND NOT WAKE UP?: NO
6. HAVE YOU EVER DONE ANYTHING, STARTED TO DO ANYTHING, OR PREPARED TO DO ANYTHING TO END YOUR LIFE?: NO

## 2024-05-17 ASSESSMENT — PAIN SCALES - GENERAL
PAINLEVEL_OUTOF10: 5 - MODERATE PAIN
PAINLEVEL_OUTOF10: 5 - MODERATE PAIN

## 2024-05-17 ASSESSMENT — PAIN - FUNCTIONAL ASSESSMENT: PAIN_FUNCTIONAL_ASSESSMENT: 0-10

## 2024-05-17 NOTE — ED TRIAGE NOTES
Patient arrives from home with complaints of weakness, sob, nausea and pressure like chest pain that has been ongoing for about 2 weeks

## 2024-05-22 LAB
ATRIAL RATE: 62 BPM
P AXIS: 21 DEGREES
PR INTERVAL: 189 MS
Q ONSET: 251 MS
QRS COUNT: 10 BEATS
QRS DURATION: 74 MS
QT INTERVAL: 416 MS
QTC CALCULATION(BAZETT): 423 MS
QTC FREDERICIA: 420 MS
R AXIS: 8 DEGREES
T AXIS: 20 DEGREES
T OFFSET: 459 MS
VENTRICULAR RATE: 62 BPM

## 2024-06-11 ENCOUNTER — LAB (OUTPATIENT)
Dept: LAB | Facility: LAB | Age: 79
End: 2024-06-11
Payer: MEDICARE

## 2024-06-11 ENCOUNTER — TELEPHONE (OUTPATIENT)
Dept: UROLOGY | Facility: CLINIC | Age: 79
End: 2024-06-11

## 2024-06-11 DIAGNOSIS — N39.41 URGE URINARY INCONTINENCE: Primary | ICD-10-CM

## 2024-06-11 DIAGNOSIS — N39.41 URGE URINARY INCONTINENCE: ICD-10-CM

## 2024-06-11 LAB
APPEARANCE UR: CLEAR
BILIRUB UR STRIP.AUTO-MCNC: NEGATIVE MG/DL
COLOR UR: COLORLESS
GLUCOSE UR STRIP.AUTO-MCNC: NORMAL MG/DL
KETONES UR STRIP.AUTO-MCNC: NEGATIVE MG/DL
LEUKOCYTE ESTERASE UR QL STRIP.AUTO: ABNORMAL
NITRITE UR QL STRIP.AUTO: NEGATIVE
PH UR STRIP.AUTO: 6.5 [PH]
PROT UR STRIP.AUTO-MCNC: NEGATIVE MG/DL
RBC # UR STRIP.AUTO: NEGATIVE /UL
RBC #/AREA URNS AUTO: NORMAL /HPF
SP GR UR STRIP.AUTO: 1.01
UROBILINOGEN UR STRIP.AUTO-MCNC: NORMAL MG/DL
WBC #/AREA URNS AUTO: NORMAL /HPF

## 2024-06-11 PROCEDURE — 81001 URINALYSIS AUTO W/SCOPE: CPT

## 2024-06-11 PROCEDURE — 87086 URINE CULTURE/COLONY COUNT: CPT

## 2024-06-12 ENCOUNTER — LAB (OUTPATIENT)
Dept: LAB | Facility: LAB | Age: 79
End: 2024-06-12
Payer: MEDICARE

## 2024-06-12 DIAGNOSIS — N39.41 URGE URINARY INCONTINENCE: ICD-10-CM

## 2024-06-12 LAB — BACTERIA UR CULT: NORMAL

## 2024-06-12 PROCEDURE — 87086 URINE CULTURE/COLONY COUNT: CPT

## 2024-06-13 LAB — BACTERIA UR CULT: NO GROWTH

## 2024-06-18 ENCOUNTER — OFFICE VISIT (OUTPATIENT)
Dept: UROLOGY | Facility: CLINIC | Age: 79
End: 2024-06-18
Payer: MEDICARE

## 2024-06-18 VITALS
WEIGHT: 176.5 LBS | HEART RATE: 71 BPM | BODY MASS INDEX: 33.35 KG/M2 | SYSTOLIC BLOOD PRESSURE: 131 MMHG | DIASTOLIC BLOOD PRESSURE: 62 MMHG

## 2024-06-18 DIAGNOSIS — N39.41 URGE URINARY INCONTINENCE: ICD-10-CM

## 2024-06-18 DIAGNOSIS — N95.2 VAGINAL ATROPHY: Primary | ICD-10-CM

## 2024-06-18 DIAGNOSIS — B37.31 YEAST VAGINITIS: ICD-10-CM

## 2024-06-18 DIAGNOSIS — R30.0 DYSURIA: ICD-10-CM

## 2024-06-18 DIAGNOSIS — N89.8 VAGINAL IRRITATION: ICD-10-CM

## 2024-06-18 DIAGNOSIS — Z46.89 PESSARY MAINTENANCE: ICD-10-CM

## 2024-06-18 DIAGNOSIS — Z96.0 PRESENCE OF PESSARY: ICD-10-CM

## 2024-06-18 PROCEDURE — 1157F ADVNC CARE PLAN IN RCRD: CPT | Performed by: OBSTETRICS & GYNECOLOGY

## 2024-06-18 PROCEDURE — 99214 OFFICE O/P EST MOD 30 MIN: CPT | Performed by: OBSTETRICS & GYNECOLOGY

## 2024-06-18 PROCEDURE — 1160F RVW MEDS BY RX/DR IN RCRD: CPT | Performed by: OBSTETRICS & GYNECOLOGY

## 2024-06-18 PROCEDURE — 1036F TOBACCO NON-USER: CPT | Performed by: OBSTETRICS & GYNECOLOGY

## 2024-06-18 PROCEDURE — 1159F MED LIST DOCD IN RCRD: CPT | Performed by: OBSTETRICS & GYNECOLOGY

## 2024-06-18 RX ORDER — CLOBETASOL PROPIONATE 0.5 MG/G
OINTMENT TOPICAL DAILY
Qty: 30 G | Refills: 1 | Status: SHIPPED | OUTPATIENT
Start: 2024-06-18

## 2024-06-18 RX ORDER — FLUCONAZOLE 150 MG/1
TABLET ORAL
Qty: 2 TABLET | Refills: 0 | Status: SHIPPED | OUTPATIENT
Start: 2024-06-18

## 2024-06-18 NOTE — PATIENT INSTRUCTIONS
Please follow-up in November 2024 for pessary maintenance.    Please start your fluconazole pill now and repeat in 3 days.    Please start your clobetasol therapy to the external vaginal tissues that are irritated and burning.    You will be contacted with the results of your GenPath should you require alternate therapy.    Please contact the clinic in 3 days should you have no significant improvements in your vaginal complaints.    [phone]

## 2024-07-01 ENCOUNTER — TELEMEDICINE (OUTPATIENT)
Dept: UROLOGY | Facility: CLINIC | Age: 79
End: 2024-07-01
Payer: MEDICARE

## 2024-07-01 DIAGNOSIS — R10.2 PELVIC PAIN: ICD-10-CM

## 2024-07-01 DIAGNOSIS — N95.2 VAGINAL ATROPHY: ICD-10-CM

## 2024-07-01 DIAGNOSIS — N89.8 VAGINAL IRRITATION: Primary | ICD-10-CM

## 2024-07-01 DIAGNOSIS — N39.41 URGE URINARY INCONTINENCE: ICD-10-CM

## 2024-07-01 PROCEDURE — 99442 PR PHYS/QHP TELEPHONE EVALUATION 11-20 MIN: CPT | Performed by: OBSTETRICS & GYNECOLOGY

## 2024-07-01 PROCEDURE — 1036F TOBACCO NON-USER: CPT | Performed by: OBSTETRICS & GYNECOLOGY

## 2024-07-01 PROCEDURE — 1157F ADVNC CARE PLAN IN RCRD: CPT | Performed by: OBSTETRICS & GYNECOLOGY

## 2024-07-01 PROCEDURE — 1160F RVW MEDS BY RX/DR IN RCRD: CPT | Performed by: OBSTETRICS & GYNECOLOGY

## 2024-07-01 PROCEDURE — 1159F MED LIST DOCD IN RCRD: CPT | Performed by: OBSTETRICS & GYNECOLOGY

## 2024-07-01 RX ORDER — DIAZEPAM 5 MG/1
5 TABLET ORAL NIGHTLY PRN
Qty: 7 TABLET | Refills: 0 | Status: SHIPPED | OUTPATIENT
Start: 2024-07-01 | End: 2024-07-08

## 2024-07-01 NOTE — PROGRESS NOTES
Subjective   Patient ID: Leni Graham is a 78 y.o. female who presents for her vaginal complaints and right sided pain.  Today's visit was done virtually after appropriate consent from the patient. Virtual or Telephone Consent     An interactive audio  telecommunication system which permits real time communications between the patient at home and provider (at the distant site) was utilized to provide this telehealth service. Greater than 10 minutes were spent discussing the patients concerns and coordinating care    HPI  This visit was performed through telemedicine   78-year-old with atrophic vaginitis and uterine prolapse with urge urinary incontinence and #3 ring without support pessary in place with worsening vaginitis and vaginal irritation complaints.    The patient continues to note burning around her vagina both internally and externally, itching and irritation for the last 2 weeks.  It does not hurt when she wipes. However it does burn when she voids. She utilized fluconazole and clobetasol without any relief.  She also noted right sided pain, she is unable to associate it with any movement changes. She notes the pain everyday, she reports the pain comes and goes. She denies any  abnormal bleeding or discharge.  She is utilizing the vaginal estrogen cream. She deneis any issues with the pessary.  She denies any vaginal discharge      She also notes urinary urgency and frequency complaints with worsening incontinence if she is out . She denies any episodes of nocturia or enuresis.     She denies any bowel related complaints, no fecal or flatal incontinence.    She has no other complaints.    From Previous note  78-year-old with atrophic vaginitis and uterine prolapse with urge urinary incontinence and #3 ring without support pessary in place.    The patient presents with complaints of burning around her vagina, itching and irritation for the last 2 weeks.  It farr snot hurt when she wipes. However it does  burn when she voids. She has been utilizing AZO with some relief. She denies any  abnormal bleeding or discharge.  She is utilizing the vaginal estrogen cream. She deneis any issues with the pessary.        Her cultures have been negative.     She also notes urinary urgency and frequency complaints with worsening incontinece. She denies any episodes of nocturia or enuresis.     She denies any bowel related complaints, no fecal or flatal incontinence.    She has no other complaints.   From Previous note   78-year-old with atrophic vaginitis and uterine prolapse with urge urinary incontinence and #3 ring without support pessary in place.     The patient appears to be doing well, she denies any vaginal complaints, no abnormal bleeding or discharge.  She is utilizing the vaginal estrogen cream.    She denies any significant changes in her LUTS. She denies any UTI like symptoms.    She denies any bowel related complaints, no fecal or flatal incontinence.    She has no other complaints.    From Previous note   78-year-old with atrophic vaginitis and uterine prolapse with urge urinary incontinence and #3 ring without support pessary in place.    The patient appears to be doing well, she denies any vaginal complaints, no abnormal bleeding or discharge.  She is utilizing the vaginal estrogen cream.    She denies any significant changes in her LUTS. She denies any UTI like symptoms.    She denies any bowel related complaints, no fecal or flatal incontinence.    She has no other complaints.    From Previous note  76-year-old presenting for follow-up with history of urge urinary incontinence and #3 ring pessary in place.     The patient feels very well controlled from a urinary urgency and urge related incontinence standpoint utilizing just her pessary. She has not utilized any anticholinergics due to cost and feels overall very well controlled. She notes 1-2 episode of nocturia and denies enuresis. She notes daytime frequency  every 2-3 hours.     She does not desire any medical therapy at this time.     She denies any vaginal complaints. She is overall very satisfied with her pessary. She does require refill on her vaginal estrogen therapy.     She has no new complaints.     From previous note:  72-year-old  presenting as a referral from Dr. Celaya for concerns for chronic urinary tract infections.     Patient denies urge or stress urinary incontinence symptoms. She denies enuresis. She notes nocturia 1-2 times at night. She denies leaking urine with laughing coughing and sneezing. She denies dysuria or hematuria.     She has noted over the last 2 months a vaginal bulge when she wipes. She is not sexually active. She denies any vaginal bleeding.     The patient was recently hospitalized for intractable nausea and vomiting. During her hospital course she was noted to have blood and leuk esterase in her urine and was treated empirically with antibiotics for a presumed urinary tract infection. A careful analysis of the last year of urinalyses and cultures demonstrate trace or negative blood with positive leukocytes and or leuk esterase. She denies any urinary urgency symptoms over the last 2 months and denies any urinary tract infection symptoms during her hospitalization stay.     She is working with Dr. Lubin for a prescription for her nausea complaints. Per her report a thorough workup while hospitalized was negative for these complaints.  Review of Systems  Constitutional: No fever, No chills and No fatigue.   Eyes: No vision problems and No dryness of the eyes.   ENT: No dry mouth, No hearing loss and No nosebleeds.   Cardiovascular: No chest pain, No palpitations and No orthopnea.   Respiratory: No shortness of breath, No cough and No wheezing.   Gastrointestinal: No abdominal pain, No constipation, No nausea, No diarrhea, No vomiting and No melena.   Genitourinary: As noted in HPI.   Musculoskeletal: No back pain, No myalgias,  No muscle weakness, No joint swelling and No leg edema.   Integumentary: No rashes, No skin lesion and No itching.   Neurological: No headache, No numbness and No dizziness.   Psychiatric: No sleep disturbances, No anxiety and No depression.   Endocrine: No hot flashes, No loss of hair and No hirsutism.   Hematologic/Lymphatic: No swollen glands, No tendency for easy bleeding and No tendency for easy bruising.   All other systems have been reviewed and are negative for complaint.        Objective   Physical Exam  This visit was performed through telemedicine     The patient's #3 ring without support pessary was removed without difficulty.  There was no significant irritation or other abnormalities.  There was no significant pain to palpation.  GenPath was obtained.    Assessment/Plan      78-year-old with atrophic vaginitis and uterine prolapse with urge urinary incontinence and #3 ring without support pessary in place with persistent right-sided pelvic and vaginal pain.     #1 we have previously discussed the [facility] OAB care pathway including first, second, and third line therapies. We discussed fluid management strategies as well as benefits of pelvic floor physical therapy. The patient does have good pelvic floor strength and no pain. Trospium is cost prohibitive and she did not attempt this medication. She was previously prescribed solifenacin but did not fill this. She presently feels that she is well controlled from an urgency standpoint and does not desire any medication therapy. We have also previously discussed third line therapies including PTNS, Botox, and InterStim.  She has recently noted 4 weeks of worsening urinary urgency and frequency complaints associated with her right sided and vaginal pelvic pain.  Will reevaluate the need to start a OAB medication following treatment of her vaginal complaints.  This is less bothersome to her than her pain complaints.     #2 she appears to be having excellent  results with her pessary and wishes to continue this moving forward.  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 She appears to be having excellent results with her #3 ring without support. The patient will require follow-up in November 2024.     #3 We discussed the safety, efficacy, proper utilization of vaginal estrogen therapy. She will continue this 3 times a week moving forward.  The patient had no benefits with fluconazole or clobetasol.  GenPath was negative 6/24/2024     #4 the patient will follow up in November 2024 for pessary maintenance.  We will proceed with diazepam therapy at night now to see if this improves her pelvic pain complaints.  Should this be of no benefit, we will discuss possible  OAB medications as well as imaging.      LIBORIO Rocha MD     Scribe Attestation  By signing my name below, I, Prachi Mcgrath attest that this documentation has been prepared under the direction and in the presence of John Rocha MD. All medical record entries made by the Scribe were at my direction or personally dictated by me. I have reviewed the chart and agree that the record accurately reflects my personal performance of the history, physical exam, discussion and plan.

## 2024-07-05 ENCOUNTER — LAB (OUTPATIENT)
Dept: LAB | Facility: LAB | Age: 79
End: 2024-07-05
Payer: MEDICARE

## 2024-07-05 DIAGNOSIS — R30.0 DYSURIA: ICD-10-CM

## 2024-07-05 DIAGNOSIS — R10.30 LOWER ABDOMINAL PAIN: ICD-10-CM

## 2024-07-05 DIAGNOSIS — R10.2 PELVIC PAIN: ICD-10-CM

## 2024-07-05 DIAGNOSIS — R10.2 PELVIC PAIN: Primary | ICD-10-CM

## 2024-07-05 DIAGNOSIS — Z96.0 PRESENCE OF PESSARY: ICD-10-CM

## 2024-07-05 LAB
ANION GAP SERPL CALC-SCNC: 12 MMOL/L (ref 10–20)
BUN SERPL-MCNC: 16 MG/DL (ref 6–23)
CALCIUM SERPL-MCNC: 9.5 MG/DL (ref 8.6–10.6)
CHLORIDE SERPL-SCNC: 97 MMOL/L (ref 98–107)
CO2 SERPL-SCNC: 28 MMOL/L (ref 21–32)
CREAT SERPL-MCNC: 0.69 MG/DL (ref 0.5–1.05)
EGFRCR SERPLBLD CKD-EPI 2021: 88 ML/MIN/1.73M*2
GLUCOSE SERPL-MCNC: 100 MG/DL (ref 74–99)
POTASSIUM SERPL-SCNC: 4.4 MMOL/L (ref 3.5–5.3)
SODIUM SERPL-SCNC: 133 MMOL/L (ref 136–145)

## 2024-07-05 PROCEDURE — 80048 BASIC METABOLIC PNL TOTAL CA: CPT

## 2024-07-05 PROCEDURE — 36415 COLL VENOUS BLD VENIPUNCTURE: CPT

## 2024-07-05 NOTE — PROGRESS NOTES
Persistent lower abdominal pain. No clear association between bladder however. Will proceed with CT imaging

## 2024-07-10 ENCOUNTER — HOSPITAL ENCOUNTER (OUTPATIENT)
Dept: RADIOLOGY | Facility: CLINIC | Age: 79
Discharge: HOME | End: 2024-07-10
Payer: MEDICARE

## 2024-07-10 DIAGNOSIS — R10.2 PELVIC PAIN: ICD-10-CM

## 2024-07-10 DIAGNOSIS — Z96.0 PRESENCE OF PESSARY: ICD-10-CM

## 2024-07-10 DIAGNOSIS — R30.0 DYSURIA: ICD-10-CM

## 2024-07-10 DIAGNOSIS — R10.30 LOWER ABDOMINAL PAIN: ICD-10-CM

## 2024-07-10 PROCEDURE — 74177 CT ABD & PELVIS W/CONTRAST: CPT

## 2024-07-10 PROCEDURE — 2550000001 HC RX 255 CONTRASTS: Performed by: OBSTETRICS & GYNECOLOGY

## 2024-07-10 PROCEDURE — 74177 CT ABD & PELVIS W/CONTRAST: CPT | Performed by: RADIOLOGY

## 2024-07-18 ENCOUNTER — APPOINTMENT (OUTPATIENT)
Dept: GASTROENTEROLOGY | Facility: CLINIC | Age: 79
End: 2024-07-18
Payer: MEDICARE

## 2024-07-18 VITALS — HEIGHT: 61 IN | BODY MASS INDEX: 33.23 KG/M2 | HEART RATE: 67 BPM | WEIGHT: 176 LBS

## 2024-07-18 DIAGNOSIS — R93.5 ABNORMAL CT OF THE ABDOMEN: Primary | ICD-10-CM

## 2024-07-18 PROCEDURE — 1157F ADVNC CARE PLAN IN RCRD: CPT | Performed by: INTERNAL MEDICINE

## 2024-07-18 PROCEDURE — 1036F TOBACCO NON-USER: CPT | Performed by: INTERNAL MEDICINE

## 2024-07-18 PROCEDURE — 99214 OFFICE O/P EST MOD 30 MIN: CPT | Performed by: INTERNAL MEDICINE

## 2024-07-18 PROCEDURE — 1159F MED LIST DOCD IN RCRD: CPT | Performed by: INTERNAL MEDICINE

## 2024-07-18 RX ORDER — POLYETHYLENE GLYCOL 3350, SODIUM SULFATE ANHYDROUS, SODIUM BICARBONATE, SODIUM CHLORIDE, POTASSIUM CHLORIDE 236; 22.74; 6.74; 5.86; 2.97 G/4L; G/4L; G/4L; G/4L; G/4L
4 POWDER, FOR SOLUTION ORAL ONCE
Qty: 4000 ML | Refills: 0 | Status: SHIPPED | OUTPATIENT
Start: 2024-07-18 | End: 2024-07-18

## 2024-07-18 RX ORDER — AMOXICILLIN 500 MG/1
CAPSULE ORAL
COMMUNITY
Start: 2024-06-10

## 2024-07-18 RX ORDER — ESCITALOPRAM OXALATE 10 MG/1
1 TABLET ORAL
COMMUNITY
Start: 2024-05-13

## 2024-07-18 ASSESSMENT — ENCOUNTER SYMPTOMS
EYES NEGATIVE: 1
HEMATOLOGIC/LYMPHATIC NEGATIVE: 1
NEUROLOGICAL NEGATIVE: 1
ALLERGIC/IMMUNOLOGIC NEGATIVE: 1
MUSCULOSKELETAL NEGATIVE: 1
ENDOCRINE NEGATIVE: 1
CARDIOVASCULAR NEGATIVE: 1
GASTROINTESTINAL NEGATIVE: 1
CONSTITUTIONAL NEGATIVE: 1
PSYCHIATRIC NEGATIVE: 1
RESPIRATORY NEGATIVE: 1

## 2024-07-18 NOTE — PROGRESS NOTES
Abnormal CAT scan    History of Present Illness:   Leni Graham is a 78 y.o. female with PMHx of gastroesophageal reflux, frequent urinary tract infections, arthritis who had a abnormal CAT scan by urology when he was evaluating her urinary symptoms and who presents to GI clinic for follow up.  Last seen around 2015 for colonoscopy.  She denies rectal bleeding, change in bowel movements, abdominal david abnormal CAT scan n or weight loss.    Her CAT scan is reviewed    Her most recent electrolytes are reviewed that shows a mildly low sodium.    Her labs show no anemia in May    Her last colonoscopy is reviewed    Review of Systems  Review of Systems   Constitutional: Negative.    HENT: Negative.     Eyes: Negative.    Respiratory: Negative.     Cardiovascular: Negative.    Gastrointestinal: Negative.    Endocrine: Negative.    Genitourinary: Negative.    Musculoskeletal: Negative.    Skin: Negative.    Allergic/Immunologic: Negative.    Neurological: Negative.    Hematological: Negative.    Psychiatric/Behavioral: Negative.     All other systems reviewed and are negative.      Allergies  Allergies   Allergen Reactions    Acetaminophen Nausea/vomiting    Adhesive Tape-Silicones Unknown    Baclofen Nausea/vomiting    Duloxetine Unknown    Ezetimibe Unknown    Meloxicam Swelling    Nitrofurantoin Unknown    Nitrofurantoin Monohyd/M-Cryst Diarrhea and Nausea/vomiting    Nortriptyline Other and Unknown    Oxcarbazepine Nausea Only, Other and Unknown     Sodium levels dropped    Oxycodone-Acetaminophen Unknown and Nausea/vomiting    Pitavastatin Other and Unknown    Pseudoephedrine Unknown    Simvastatin Other and Unknown    Statins-Hmg-Coa Reductase Inhibitors Other and Unknown    Sulfa (Sulfonamide Antibiotics) GI Upset    Sulfamethoxazole-Trimethoprim Nausea/vomiting    Tramadol GI Upset and Unknown     Nausea, shaking, severe headache    Cephalexin Rash and Unknown     rash    Diphenhydramine Rash and Swelling      Swelling in feet    Levofloxacin Rash       Medications  Current Outpatient Medications   Medication Instructions    amoxicillin (Amoxil) 500 mg capsule TK FOUR CS PO 1 HOUR B DAPP    aspirin 81 mg, oral, Daily    clobetasol (Temovate) 0.05 % ointment Topical, Daily, Apply to the external vaginal tissues daily for 2 weeks.    diazePAM (VALIUM) 5 mg, oral, Nightly PRN    escitalopram (Lexapro) 10 mg tablet 1 tablet, oral, Daily (0630)    estradiol (Estrace) 0.01 % (0.1 mg/gram) vaginal cream Place a dime sized amount vaginally 3 times a week.    fluconazole (Diflucan) 150 mg tablet Take 1 tablet now and repeat in 3 days.    fluticasone propion-salmeteroL (Advair Diskus) 100-50 mcg/dose diskus inhaler 1 puff, inhalation, 2 times daily    methylPREDNISolone (Medrol Dospak) 4 mg tablets Use as directed by package instructions    omeprazole (PRILOSEC) 20 mg, oral, Daily        Objective   Visit Vitals  Pulse 67      Physical Exam  Constitutional:       Appearance: Normal appearance.   Eyes:      Conjunctiva/sclera: Conjunctivae normal.   Cardiovascular:      Rate and Rhythm: Normal rate and regular rhythm.   Pulmonary:      Effort: Pulmonary effort is normal.      Breath sounds: Normal breath sounds.   Abdominal:      General: Abdomen is flat. Bowel sounds are normal.      Palpations: Abdomen is soft.   Musculoskeletal:         General: Normal range of motion.      Cervical back: Normal range of motion.   Neurological:      Mental Status: She is alert.           Lab Results   Component Value Date    WBC 6.5 05/17/2024    WBC 6.8 03/06/2024    WBC 5.8 04/13/2022    HGB 12.8 05/17/2024    HGB 13.3 03/06/2024    HGB 14.0 04/13/2022    HCT 38.9 05/17/2024    HCT 42.3 03/06/2024    HCT 43.7 04/13/2022     05/17/2024     03/06/2024     04/13/2022     Lab Results   Component Value Date     (L) 07/05/2024     05/17/2024     03/06/2024    K 4.4 07/05/2024    K 4.0 05/17/2024    K 4.6  03/06/2024    CL 97 (L) 07/05/2024     05/17/2024     03/06/2024    CO2 28 07/05/2024    CO2 27 05/17/2024    CO2 28 03/06/2024    BUN 16 07/05/2024    BUN 13 05/17/2024    BUN 12 03/06/2024    CREATININE 0.69 07/05/2024    CREATININE 0.71 05/17/2024    CREATININE 0.70 03/06/2024    CALCIUM 9.5 07/05/2024    CALCIUM 9.3 05/17/2024    CALCIUM 9.9 03/06/2024    PROT 6.2 (L) 05/17/2024    PROT 6.6 03/06/2024    PROT 6.1 (L) 11/08/2021    BILITOT 0.6 05/17/2024    BILITOT 0.5 03/06/2024    BILITOT 0.4 11/08/2021    ALKPHOS 97 05/17/2024    ALKPHOS 102 03/06/2024    ALKPHOS 94 11/08/2021    ALT 9 05/17/2024    ALT 10 03/06/2024    ALT 14 11/08/2021    AST 12 05/17/2024    AST 15 03/06/2024    AST 18 11/08/2021    GLUCOSE 100 (H) 07/05/2024    GLUCOSE 99 05/17/2024    GLUCOSE 88 03/06/2024           Leni Graham is a 78 y.o. female who presents to GI clinic for abnormal CAT scan.    Abnormal CT of the abdomen  Patient is a 78-year-old with a history of urinary issues that led to a CAT scan of the abdomen showing some questionable thickening in the transverse colon versus underdistention.  She had a colonoscopy 2015.  She really has no bowel movement issues at this point.  She is not anemic on most recent blood work in May.  I do recommend we pursue colonoscopy due to the abnormal CAT scan       Channing Lewis MD

## 2024-07-18 NOTE — ASSESSMENT & PLAN NOTE
Patient is a 78-year-old with a history of urinary issues that led to a CAT scan of the abdomen showing some questionable thickening in the transverse colon versus underdistention.  She had a colonoscopy 2015.  She really has no bowel movement issues at this point.  She is not anemic on most recent blood work in May.  I do recommend we pursue colonoscopy due to the abnormal CAT scan

## 2024-09-11 ENCOUNTER — APPOINTMENT (OUTPATIENT)
Dept: GASTROENTEROLOGY | Facility: EXTERNAL LOCATION | Age: 79
End: 2024-09-11
Payer: MEDICARE

## 2024-09-11 DIAGNOSIS — R93.5 ABNORMAL CT OF THE ABDOMEN: ICD-10-CM

## 2024-09-11 DIAGNOSIS — K52.832 LYMPHOCYTIC COLITIS: ICD-10-CM

## 2024-09-11 DIAGNOSIS — D12.2 BENIGN NEOPLASM OF ASCENDING COLON: Primary | ICD-10-CM

## 2024-09-11 PROCEDURE — 88305 TISSUE EXAM BY PATHOLOGIST: CPT

## 2024-09-11 PROCEDURE — 0753T DGTZ GLS MCRSCP SLD LEVEL IV: CPT

## 2024-09-11 PROCEDURE — 88305 TISSUE EXAM BY PATHOLOGIST: CPT | Performed by: PATHOLOGY

## 2024-09-11 PROCEDURE — 45385 COLONOSCOPY W/LESION REMOVAL: CPT | Performed by: INTERNAL MEDICINE

## 2024-09-11 PROCEDURE — 1157F ADVNC CARE PLAN IN RCRD: CPT | Performed by: INTERNAL MEDICINE

## 2024-09-11 PROCEDURE — 45380 COLONOSCOPY AND BIOPSY: CPT | Performed by: INTERNAL MEDICINE

## 2024-09-11 NOTE — PROGRESS NOTES
Patient underwent colonoscopy for chronic diarrhea and history of lymphocytic colitis.  Colonoscopy today showed diffuse diverticulosis, 2 polyps, and biopsies were done for lymphocytic colitis.  If lymphocytic colitis is present and her symptoms are continuous we will consider budesonide.  No further colonoscopy needed.

## 2024-09-12 ENCOUNTER — LAB REQUISITION (OUTPATIENT)
Dept: LAB | Facility: HOSPITAL | Age: 79
End: 2024-09-12
Payer: MEDICARE

## 2024-09-18 LAB
LABORATORY COMMENT REPORT: NORMAL
PATH REPORT.FINAL DX SPEC: NORMAL
PATH REPORT.GROSS SPEC: NORMAL
PATH REPORT.RELEVANT HX SPEC: NORMAL
PATH REPORT.TOTAL CANCER: NORMAL

## 2024-09-24 ENCOUNTER — APPOINTMENT (OUTPATIENT)
Dept: UROLOGY | Facility: CLINIC | Age: 79
End: 2024-09-24
Payer: MEDICARE

## 2024-10-21 DIAGNOSIS — K21.9 GASTROESOPHAGEAL REFLUX DISEASE, UNSPECIFIED WHETHER ESOPHAGITIS PRESENT: ICD-10-CM

## 2024-10-21 RX ORDER — OMEPRAZOLE 20 MG/1
20 CAPSULE, DELAYED RELEASE ORAL DAILY
Qty: 90 CAPSULE | Refills: 2 | Status: SHIPPED | OUTPATIENT
Start: 2024-10-21

## 2024-11-05 ENCOUNTER — APPOINTMENT (OUTPATIENT)
Dept: UROLOGY | Facility: CLINIC | Age: 79
End: 2024-11-05
Payer: MEDICARE

## 2024-11-05 VITALS
HEART RATE: 65 BPM | BODY MASS INDEX: 33.12 KG/M2 | WEIGHT: 172.4 LBS | DIASTOLIC BLOOD PRESSURE: 63 MMHG | SYSTOLIC BLOOD PRESSURE: 124 MMHG

## 2024-11-05 DIAGNOSIS — N89.8 VAGINAL IRRITATION: ICD-10-CM

## 2024-11-05 DIAGNOSIS — R10.2 PELVIC PAIN: ICD-10-CM

## 2024-11-05 DIAGNOSIS — Z46.89 PESSARY MAINTENANCE: ICD-10-CM

## 2024-11-05 DIAGNOSIS — Z96.0 PRESENCE OF PESSARY: ICD-10-CM

## 2024-11-05 DIAGNOSIS — N39.41 URGE URINARY INCONTINENCE: Primary | ICD-10-CM

## 2024-11-05 DIAGNOSIS — N95.2 VAGINAL ATROPHY: ICD-10-CM

## 2024-11-05 PROCEDURE — G2211 COMPLEX E/M VISIT ADD ON: HCPCS | Performed by: OBSTETRICS & GYNECOLOGY

## 2024-11-05 PROCEDURE — 1159F MED LIST DOCD IN RCRD: CPT | Performed by: OBSTETRICS & GYNECOLOGY

## 2024-11-05 PROCEDURE — 1160F RVW MEDS BY RX/DR IN RCRD: CPT | Performed by: OBSTETRICS & GYNECOLOGY

## 2024-11-05 PROCEDURE — 1157F ADVNC CARE PLAN IN RCRD: CPT | Performed by: OBSTETRICS & GYNECOLOGY

## 2024-11-05 PROCEDURE — 99213 OFFICE O/P EST LOW 20 MIN: CPT | Performed by: OBSTETRICS & GYNECOLOGY

## 2024-11-05 PROCEDURE — 1036F TOBACCO NON-USER: CPT | Performed by: OBSTETRICS & GYNECOLOGY

## 2024-11-05 RX ORDER — ESTRADIOL 0.1 MG/G
CREAM VAGINAL
Qty: 42.5 G | Refills: 3 | Status: SHIPPED | OUTPATIENT
Start: 2024-11-05 | End: 2024-11-05

## 2024-11-05 RX ORDER — ESTRADIOL 0.1 MG/G
CREAM VAGINAL
Qty: 42.5 G | Refills: 3 | Status: SHIPPED | OUTPATIENT
Start: 2024-11-05

## 2024-11-05 NOTE — PATIENT INSTRUCTIONS
Please follow-up in February/March 2025 for pessary maintenance.    Please continue your vaginal estrogen therapy nightly for 2 - 3 times a week. Do not use the plastic applicator and only use your fingertip.     Call the clinic with questions or concerns.    [phone]

## 2024-11-05 NOTE — PROGRESS NOTES
Subjective   Patient ID: Leni Graham is a 79 y.o. female who presents for her vaginal complaints and right sided pain.    HPI   78-year-old with atrophic vaginitis and uterine prolapse with urge urinary incontinence and #3 ring without support pessary in place with persistent right-sided pelvic and vaginal pain.     The patient denies any complaints today. She is satisfied with he pessary. She denies any vaginal complaints, no abnormal bleeding or discharge.      She has had complete resolution of her lower abdominal pain.  CT imaging at that time was concerning for diverticular concerns.    She denies any urinary urgency and frequency complaints . She denies any episodes of nocturia or enuresis.     She denies any bowel related complaints, no fecal or flatal incontinence.    She has no other complaints.  From Previous note  This visit was performed through telemedicine   78-year-old with atrophic vaginitis and uterine prolapse with urge urinary incontinence and #3 ring without support pessary in place with worsening vaginitis and vaginal irritation complaints.    The patient continues to note burning around her vagina both internally and externally, itching and irritation for the last 2 weeks.  It does not hurt when she wipes. However it does burn when she voids. She utilized fluconazole and clobetasol without any relief.  She also noted right sided pain, she is unable to associate it with any movement changes. She notes the pain everyday, she reports the pain comes and goes. She denies any  abnormal bleeding or discharge.  She is utilizing the vaginal estrogen cream. She deneis any issues with the pessary.  She denies any vaginal discharge      She also notes urinary urgency and frequency complaints with worsening incontinence if she is out . She denies any episodes of nocturia or enuresis.     She denies any bowel related complaints, no fecal or flatal incontinence.    She has no other complaints.    From  Previous note  78-year-old with atrophic vaginitis and uterine prolapse with urge urinary incontinence and #3 ring without support pessary in place.    The patient presents with complaints of burning around her vagina, itching and irritation for the last 2 weeks.  It farr snot hurt when she wipes. However it does burn when she voids. She has been utilizing AZO with some relief. She denies any  abnormal bleeding or discharge.  She is utilizing the vaginal estrogen cream. She deneis any issues with the pessary.        Her cultures have been negative.     She also notes urinary urgency and frequency complaints with worsening incontinece. She denies any episodes of nocturia or enuresis.     She denies any bowel related complaints, no fecal or flatal incontinence.    She has no other complaints.   From Previous note   78-year-old with atrophic vaginitis and uterine prolapse with urge urinary incontinence and #3 ring without support pessary in place.     The patient appears to be doing well, she denies any vaginal complaints, no abnormal bleeding or discharge.  She is utilizing the vaginal estrogen cream.    She denies any significant changes in her LUTS. She denies any UTI like symptoms.    She denies any bowel related complaints, no fecal or flatal incontinence.    She has no other complaints.    From Previous note   78-year-old with atrophic vaginitis and uterine prolapse with urge urinary incontinence and #3 ring without support pessary in place.    The patient appears to be doing well, she denies any vaginal complaints, no abnormal bleeding or discharge.  She is utilizing the vaginal estrogen cream.    She denies any significant changes in her LUTS. She denies any UTI like symptoms.    She denies any bowel related complaints, no fecal or flatal incontinence.    She has no other complaints.    From Previous note  76-year-old presenting for follow-up with history of urge urinary incontinence and #3 ring pessary in  place.     The patient feels very well controlled from a urinary urgency and urge related incontinence standpoint utilizing just her pessary. She has not utilized any anticholinergics due to cost and feels overall very well controlled. She notes 1-2 episode of nocturia and denies enuresis. She notes daytime frequency every 2-3 hours.     She does not desire any medical therapy at this time.     She denies any vaginal complaints. She is overall very satisfied with her pessary. She does require refill on her vaginal estrogen therapy.     She has no new complaints.     From previous note:  72-year-old  presenting as a referral from Dr. Celaya for concerns for chronic urinary tract infections.     Patient denies urge or stress urinary incontinence symptoms. She denies enuresis. She notes nocturia 1-2 times at night. She denies leaking urine with laughing coughing and sneezing. She denies dysuria or hematuria.     She has noted over the last 2 months a vaginal bulge when she wipes. She is not sexually active. She denies any vaginal bleeding.     The patient was recently hospitalized for intractable nausea and vomiting. During her hospital course she was noted to have blood and leuk esterase in her urine and was treated empirically with antibiotics for a presumed urinary tract infection. A careful analysis of the last year of urinalyses and cultures demonstrate trace or negative blood with positive leukocytes and or leuk esterase. She denies any urinary urgency symptoms over the last 2 months and denies any urinary tract infection symptoms during her hospitalization stay.     She is working with Dr. Lubin for a prescription for her nausea complaints. Per her report a thorough workup while hospitalized was negative for these complaints.  Review of Systems  Constitutional: No fever, No chills and No fatigue.   Eyes: No vision problems and No dryness of the eyes.   ENT: No dry mouth, No hearing loss and No nosebleeds.    Cardiovascular: No chest pain, No palpitations and No orthopnea.   Respiratory: No shortness of breath, No cough and No wheezing.   Gastrointestinal: No abdominal pain, No constipation, No nausea, No diarrhea, No vomiting and No melena.   Genitourinary: As noted in HPI.   Musculoskeletal: No back pain, No myalgias, No muscle weakness, No joint swelling and No leg edema.   Integumentary: No rashes, No skin lesion and No itching.   Neurological: No headache, No numbness and No dizziness.   Psychiatric: No sleep disturbances, No anxiety and No depression.   Endocrine: No hot flashes, No loss of hair and No hirsutism.   Hematologic/Lymphatic: No swollen glands, No tendency for easy bleeding and No tendency for easy bruising.   All other systems have been reviewed and are negative for complaint.        Objective   Physical Exam  This visit was performed through telemedicine     The patient's #3 ring without support pessary was removed without difficulty.  There was no significant irritation or other abnormalities.  There was no significant pain to palpation.      Assessment/Plan      79-year-old with atrophic vaginitis and uterine prolapse with urge urinary incontinence and #3 ring without support pessary in place.     #1 we have previously discussed the [facility] care pathway including first, second, and third line therapies. We discussed fluid management strategies as well as benefits of pelvic floor physical therapy. The patient does have good pelvic floor strength and no pain. Trospium is cost prohibitive and she did not attempt this medication. She was previously prescribed solifenacin but did not fill this. She presently feels that she is well controlled from an urgency standpoint and does not desire any medication therapy. We have also previously discussed third line therapies including PTNS, Botox, and InterStim.  She denies any lower urinary tract concerns at this time does not desire any further therapy.     #2  she appears to be having excellent results with her pessary and wishes to continue this moving forward.  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 She appears to be having excellent results with her #3 ring without support. The patient will require follow-up in March 2025     #3 We discussed the safety, efficacy, proper utilization of vaginal estrogen therapy. She will continue this 3 times a week moving forward.  A new prescription was called into her pharmacy.     #4 the patient will follow up in March 2025 for pessary maintenance.    LIBORIO Rocha MD     Scribe Attestation  By signing my name below, I, Prachi Mcgrath attest that this documentation has been prepared under the direction and in the presence of John Rocha MD. All medical record entries made by the Scribe were at my direction or personally dictated by me. I have reviewed the chart and agree that the record accurately reflects my personal performance of the history, physical exam, discussion and plan.

## 2024-12-01 ENCOUNTER — APPOINTMENT (OUTPATIENT)
Dept: RADIOLOGY | Facility: HOSPITAL | Age: 79
End: 2024-12-01
Payer: MEDICARE

## 2024-12-01 ENCOUNTER — HOSPITAL ENCOUNTER (EMERGENCY)
Facility: HOSPITAL | Age: 79
Discharge: HOME | End: 2024-12-01
Attending: EMERGENCY MEDICINE
Payer: MEDICARE

## 2024-12-01 VITALS
HEIGHT: 61 IN | HEART RATE: 60 BPM | DIASTOLIC BLOOD PRESSURE: 69 MMHG | TEMPERATURE: 98.9 F | RESPIRATION RATE: 16 BRPM | BODY MASS INDEX: 33.04 KG/M2 | SYSTOLIC BLOOD PRESSURE: 140 MMHG | OXYGEN SATURATION: 96 % | WEIGHT: 175 LBS

## 2024-12-01 DIAGNOSIS — M79.18 MUSCULOSKELETAL PAIN: ICD-10-CM

## 2024-12-01 DIAGNOSIS — S09.90XA HEAD INJURY, INITIAL ENCOUNTER: Primary | ICD-10-CM

## 2024-12-01 PROCEDURE — 99285 EMERGENCY DEPT VISIT HI MDM: CPT | Mod: 25

## 2024-12-01 PROCEDURE — 72131 CT LUMBAR SPINE W/O DYE: CPT

## 2024-12-01 PROCEDURE — 72128 CT CHEST SPINE W/O DYE: CPT

## 2024-12-01 PROCEDURE — 96372 THER/PROPH/DIAG INJ SC/IM: CPT | Performed by: EMERGENCY MEDICINE

## 2024-12-01 PROCEDURE — 72125 CT NECK SPINE W/O DYE: CPT

## 2024-12-01 PROCEDURE — 2500000004 HC RX 250 GENERAL PHARMACY W/ HCPCS (ALT 636 FOR OP/ED): Performed by: EMERGENCY MEDICINE

## 2024-12-01 PROCEDURE — 2500000005 HC RX 250 GENERAL PHARMACY W/O HCPCS: Performed by: EMERGENCY MEDICINE

## 2024-12-01 PROCEDURE — 70450 CT HEAD/BRAIN W/O DYE: CPT

## 2024-12-01 PROCEDURE — 72131 CT LUMBAR SPINE W/O DYE: CPT | Mod: FOREIGN READ | Performed by: RADIOLOGY

## 2024-12-01 PROCEDURE — 73030 X-RAY EXAM OF SHOULDER: CPT | Mod: 50

## 2024-12-01 PROCEDURE — 72128 CT CHEST SPINE W/O DYE: CPT | Mod: FOREIGN READ | Performed by: RADIOLOGY

## 2024-12-01 PROCEDURE — 70450 CT HEAD/BRAIN W/O DYE: CPT | Performed by: RADIOLOGY

## 2024-12-01 PROCEDURE — 73030 X-RAY EXAM OF SHOULDER: CPT | Mod: BILATERAL PROCEDURE | Performed by: RADIOLOGY

## 2024-12-01 PROCEDURE — 72125 CT NECK SPINE W/O DYE: CPT | Performed by: RADIOLOGY

## 2024-12-01 RX ORDER — LIDOCAINE 560 MG/1
1 PATCH PERCUTANEOUS; TOPICAL; TRANSDERMAL ONCE
Status: DISCONTINUED | OUTPATIENT
Start: 2024-12-01 | End: 2024-12-01 | Stop reason: HOSPADM

## 2024-12-01 RX ORDER — FENTANYL CITRATE 50 UG/ML
12.5 INJECTION, SOLUTION INTRAMUSCULAR; INTRAVENOUS ONCE
Status: COMPLETED | OUTPATIENT
Start: 2024-12-01 | End: 2024-12-01

## 2024-12-01 RX ORDER — LIDOCAINE 560 MG/1
1 PATCH PERCUTANEOUS; TOPICAL; TRANSDERMAL DAILY
Qty: 5 PATCH | Refills: 0 | Status: SHIPPED | OUTPATIENT
Start: 2024-12-01 | End: 2024-12-06

## 2024-12-01 RX ORDER — FENTANYL CITRATE 50 UG/ML
12.5 INJECTION, SOLUTION INTRAMUSCULAR; INTRAVENOUS ONCE
Status: DISCONTINUED | OUTPATIENT
Start: 2024-12-01 | End: 2024-12-01

## 2024-12-01 ASSESSMENT — PAIN DESCRIPTION - LOCATION: LOCATION: BACK

## 2024-12-01 ASSESSMENT — PAIN SCALES - GENERAL
PAINLEVEL_OUTOF10: 5 - MODERATE PAIN
PAINLEVEL_OUTOF10: 7
PAINLEVEL_OUTOF10: 8

## 2024-12-01 ASSESSMENT — PAIN - FUNCTIONAL ASSESSMENT
PAIN_FUNCTIONAL_ASSESSMENT: 0-10

## 2024-12-01 ASSESSMENT — PAIN DESCRIPTION - PAIN TYPE: TYPE: ACUTE PAIN

## 2024-12-01 ASSESSMENT — PAIN DESCRIPTION - DESCRIPTORS: DESCRIPTORS: ACHING;TIGHTNESS

## 2024-12-01 NOTE — ED TRIAGE NOTES
Pt to ED from home post fall. Pt was on a step stool when she fell backwards and hit her head on a cabinet. Pt up walking for EMS, having neck pain and back pain. Pt is not on thinners. Unknown if LOC. PT has a bump on the back L of her head. Pt placed in ccollar by EMS.

## 2024-12-02 NOTE — DISCHARGE INSTRUCTIONS
Please make sure to follow-up with your primary care doctor.  Return immediately if concerning symptoms, as discussed.  Use medications as indicated for length of time instructed.

## 2024-12-02 NOTE — ED PROVIDER NOTES
HPI   Chief Complaint   Patient presents with    Fall       HPI  Patient is an 79-year-old female with a past medical history significant for gastritis, hiatal hernia, UTI, encephalopathy, degenerative disc disease in the lumbar region who presents emergency room with a chief complaint of mechanical fall with head trauma neck and back pain.  Patient states that she was cleaning above the refrigerator at home while standing on a stepstool.  She was getting off the stepstool in her usual state of health when she took a misstep, fell backwards and hit her head against a cabinet.  If she lost consciousness she states that it was at most 1 second.  She had pain to the back of the head, neck and diffusely over the back but was able to grab a chair and pull herself up to call for help.  She denies any visual field cuts, neurologic deficits, incontinence of bowel or bladder, saddle anesthesia.  She denies any other pain other than pain from the neck radiating to the shoulders.  She is not on anticoagulation.  She has a history of chronic back issues but denies any back surgeries.      PMHx: Reviewed per EMR  PSHx: Denies pertinent  FamilyHx: Denies pertinent  SocialHx: Denies  Allergies: Per EMR  Medications: See Medication Reconciliation     ROS  As above otherwise denies      Physical Exam    GENERAL: Awake and Alert, No Acute Distress  HEENT: AT/NC, PERRL, EOMI, Normal Oropharynx, No Signs of Dehydration  NECK: Normal Inspection, No JVD  CARDIOVASCULAR: RRR, No M/R/G  RESPIRATORY: CTA Bilaterally, No Wheezes, Rales or Rhonchi, Chest Wall Non-tender  ABDOMEN: Soft, non-tender abdomen, Normal Bowel Sounds, No Distention  BACK: C-collar in place.  Mild tenderness to palpation over C, T, L-spine.  No CVA Tenderness  SKIN: Normal Color, Warm, Dry, No Rashes   EXTREMITIES: Non-Tender, Full ROM, No Pedal Edema  NEURO: A&O x 3, Normal Motor and Sensation, Normal Mood and Affect    Nursing Assessment and Vitals  Reviewed    Medical Decision  Patient is an 79-year-old female with a past medical history significant for gastritis, hiatal hernia, UTI, encephalopathy, degenerative disc disease in the lumbar region who presents emergency room with a chief complaint of mechanical fall with head trauma neck and back pain.  Patient states that she was cleaning above the refrigerator at home while standing on a stepstool.  She was getting off the stepstool in her usual state of health when she took a misstep, fell backwards and hit her head against a cabinet.  If she lost consciousness she states that it was at most 1 second.  She had pain to the back of the head, neck and diffusely over the back but was able to grab a chair and pull herself up to call for help.  She denies any visual field cuts, neurologic deficits, incontinence of bowel or bladder, saddle anesthesia.  She denies any other pain other than pain from the neck radiating to the shoulders.  She is not on anticoagulation.  She has a history of chronic back issues but denies any back surgeries.    On evaluation patient is well-appearing and in no acute distress and initially not requiring pain medications.  Ultimately she did request some and was started on low-dose fentanyl as she does have a history of allergies to Percocet but at most it is a nausea vomiting.  She does have pain to the C, T, L-spine but no step-offs, skin changes.  She is otherwise neurologically intact.  Will obtain CT imaging of the head, C, T, L-spine.    CT of the head showed some soft tissue swelling in the right parietal and midline occipital scalp but no intracranial pathology of emergent nature.  No signs of hemorrhage.  Other chronic changes per report.  CT of the cervical spine showed no fracture or traumatic subluxation but chronic changes.  CT of the thoracic spine showed no acute fracture or dislocation.  She has kyphosis, ectasia of the esophagus.  CT lumbar spine showed left for convexity  and degenerative changes but no acute fracture or malalignment.  X-ray of the shoulders showed degenerative changes without acute injury.    On reevaluation she is very well-appearing and in no acute distress ambulating without difficulty even before fentanyl administration.  She has a myriad of allergies including acetaminophen, oxycodone, oxycodone but states that she can take Motrin at home without difficulty.  She is discharged in stable condition to follow-up with her primary care doctor.  She is educated on signs and symptoms that should prompt immediate return to emergency room.              Patient History   Past Medical History:   Diagnosis Date    Acute candidiasis of vulva and vagina 06/12/2019    Vaginitis due to Candida albicans    Anesthesia of skin 07/24/2019    Numbness and tingling    Disorder of the skin and subcutaneous tissue, unspecified 04/09/2019    Skin lesion of face    Encounter for fitting and adjustment of other specified devices 07/14/2021    Fitting and adjustment of pessary    Encounter for gynecological examination (general) (routine) without abnormal findings 11/06/2018    Encounter for annual routine gynecological examination    Encounter for other preprocedural examination 11/11/2019    Preop examination    Encounter for screening for nutritional disorder 09/09/2019    Encounter for vitamin deficiency screening    Hyperglycemia, unspecified 09/04/2019    Acute hyperglycemia    Lymphocytic colitis 10/04/2016    Lymphocytic colitis    Myalgia, other site 10/31/2016    Myofascial pain syndrome    Nausea with vomiting, unspecified 10/18/2017    Non-intractable vomiting with nausea, unspecified vomiting type    Osteoarthritis of knee, unspecified 09/04/2019    Osteoarthritis of knee    Other abnormal findings on diagnostic imaging of central nervous system 01/11/2018    Abnormal brain MRI    Other allergic rhinitis 03/12/2018    Other allergic rhinitis    Other seasonal allergic  rhinitis 10/06/2016    Seasonal allergies    Overweight     Overweight    Pain in left knee 12/13/2018    Knee pain, left    Pain in left shoulder 07/07/2017    Chronic left shoulder pain    Pain in leg, unspecified 09/11/2018    Leg pain    Pain in right hip 10/17/2019    Right hip pain    Pain in right knee 06/28/2018    Right knee pain    Personal history of other diseases of the circulatory system     History of hypertension    Personal history of other diseases of the circulatory system     History of cardiac murmur    Personal history of other diseases of the digestive system 12/01/2015    History of biliary colic    Personal history of other diseases of the digestive system 09/22/2022    History of gastroesophageal reflux (GERD)    Personal history of other diseases of the respiratory system 05/23/2019    History of acute bronchitis with bronchospasm    Personal history of other diseases of the respiratory system 01/10/2019    History of influenza    Personal history of other diseases of the respiratory system 10/17/2019    History of acute sinusitis    Personal history of other diseases of the respiratory system     History of asthma    Personal history of other endocrine, nutritional and metabolic disease     History of hypercholesterolemia    Personal history of other endocrine, nutritional and metabolic disease 03/06/2018    History of thyroid nodule    Personal history of other mental and behavioral disorders 09/12/2019    History of anxiety    Personal history of other mental and behavioral disorders 05/17/2016    History of panic disorder    Personal history of other specified conditions 10/15/2018    History of nausea and vomiting    Personal history of other specified conditions 12/18/2017    History of palpitations    Personal history of other specified conditions 10/24/2019    History of snoring    Personal history of other specified conditions 06/07/2019    History of chest pain    Presence of  right artificial knee joint 09/11/2018    Status post total right knee replacement    Somnolence 07/24/2019    Daytime somnolence    Strain of muscle, fascia and tendon of lower back, initial encounter 04/27/2018    Strain of lumbar paraspinous muscle, initial encounter    Toxic gastroenteritis and colitis 10/18/2017    Diarrhea due to drug    Unilateral primary osteoarthritis, left knee 11/26/2019    Arthritis of knee, left    Unilateral primary osteoarthritis, left knee 08/14/2019    Arthritis of knee, left    Unilateral primary osteoarthritis, right knee 06/28/2018    Arthritis of knee, right     Past Surgical History:   Procedure Laterality Date    CATARACT EXTRACTION  09/28/2016    Cataract Surgery    CHOLECYSTECTOMY  12/29/2015    Cholecystectomy Laparoscopic    MR HEAD ANGIO WO IV CONTRAST  11/15/2017    MR HEAD ANGIO WO IV CONTRAST 11/15/2017 [facility]    MR NECK ANGIO WO IV CONTRAST  11/15/2017    MR NECK ANGIO WO IV CONTRAST 11/15/2017 [facility]    OTHER SURGICAL HISTORY  10/24/2017    Anterior Colporrhaphy, Repair Of Cystocele    OTHER SURGICAL HISTORY  06/10/2022    Knee replacement    OTHER SURGICAL HISTORY  12/29/2015    Biopsy Skin    TUBAL LIGATION  05/12/2015    Tubal Ligation     No family history on file.  Social History     Tobacco Use    Smoking status: Never    Smokeless tobacco: Never   Vaping Use    Vaping status: Never Used   Substance Use Topics    Alcohol use: Not Currently     Comment: RARELY    Drug use: Never       Physical Exam   ED Triage Vitals   Temperature Heart Rate Respirations BP   12/01/24 1753 12/01/24 1753 12/01/24 1753 12/01/24 1753   37.2 °C (98.9 °F) 67 18 138/81      Pulse Ox Temp Source Heart Rate Source Patient Position   12/01/24 1753 12/01/24 1753 12/01/24 1927 12/01/24 1927   94 % Oral Monitor Sitting      BP Location FiO2 (%)     12/01/24 1927 --     Right arm        Physical Exam      ED Course & MDM   Diagnoses as of 12/01/24 2123   Head  injury, initial encounter   Musculoskeletal pain                 No data recorded     [place] Coma Scale Score: 15 (12/01/24 1800 : Lulu Diop RN)                           Medical Decision Making      Procedure  Procedures     Maame Haines MD  12/01/24 9539

## 2025-01-23 ENCOUNTER — TELEPHONE (OUTPATIENT)
Dept: CARDIOLOGY | Facility: CLINIC | Age: 80
End: 2025-01-23
Payer: MEDICARE

## 2025-01-23 NOTE — TELEPHONE ENCOUNTER
Pt states she has been huffing and puffing   Feeling like she is getting out of breath fast    Does not know if it the cold or asthma    Has not been seen since 10-31-22    CS made her an apt but not til May

## 2025-01-24 NOTE — TELEPHONE ENCOUNTER
Called pt. I offered a sooner appt with BETH but pt declined. She is unsure if related to cold weather/anxiety/asthma. She  just exercised and feels ok. I recommended that she touch base with PCP/pulmonary. Pt states that she is seeing pulm next week. Pt would like to keep the Ramicone OV that is scheduled for May.

## 2025-02-13 ENCOUNTER — APPOINTMENT (OUTPATIENT)
Dept: UROLOGY | Facility: CLINIC | Age: 80
End: 2025-02-13
Payer: MEDICARE

## 2025-02-17 DIAGNOSIS — K52.832 LYMPHOCYTIC COLITIS: ICD-10-CM

## 2025-02-17 RX ORDER — IPRATROPIUM BROMIDE 42 UG/1
SPRAY, METERED NASAL
COMMUNITY
Start: 2024-10-15

## 2025-02-17 RX ORDER — GABAPENTIN 100 MG/1
CAPSULE ORAL
COMMUNITY
Start: 2024-06-05

## 2025-02-17 RX ORDER — LIDOCAINE HYDROCHLORIDE 20 MG/ML
SOLUTION OROPHARYNGEAL
COMMUNITY
Start: 2024-10-15

## 2025-02-17 RX ORDER — VALACYCLOVIR HYDROCHLORIDE 1 G/1
2000 TABLET, FILM COATED ORAL 2 TIMES DAILY
COMMUNITY
Start: 2024-10-15 | End: 2024-10-16

## 2025-02-17 RX ORDER — HYOSCYAMINE SULFATE 0.125 MG
0.12 TABLET ORAL AS NEEDED
Qty: 30 TABLET | Refills: 11 | Status: SHIPPED | OUTPATIENT
Start: 2025-02-17

## 2025-02-17 RX ORDER — ALBUTEROL SULFATE 0.83 MG/ML
SOLUTION RESPIRATORY (INHALATION)
COMMUNITY
Start: 2024-10-25

## 2025-02-17 RX ORDER — BENZONATATE 100 MG/1
CAPSULE ORAL
COMMUNITY
Start: 2024-10-21

## 2025-02-21 ENCOUNTER — APPOINTMENT (OUTPATIENT)
Dept: UROLOGY | Facility: CLINIC | Age: 80
End: 2025-02-21
Payer: MEDICARE

## 2025-03-04 ENCOUNTER — APPOINTMENT (OUTPATIENT)
Dept: UROLOGY | Facility: CLINIC | Age: 80
End: 2025-03-04
Payer: MEDICARE

## 2025-03-11 ENCOUNTER — APPOINTMENT (OUTPATIENT)
Dept: UROLOGY | Facility: CLINIC | Age: 80
End: 2025-03-11
Payer: MEDICARE

## 2025-03-21 ENCOUNTER — APPOINTMENT (OUTPATIENT)
Dept: UROLOGY | Facility: CLINIC | Age: 80
End: 2025-03-21
Payer: MEDICARE

## 2025-03-21 VITALS
BODY MASS INDEX: 33.07 KG/M2 | HEART RATE: 65 BPM | SYSTOLIC BLOOD PRESSURE: 124 MMHG | HEIGHT: 61 IN | DIASTOLIC BLOOD PRESSURE: 70 MMHG | TEMPERATURE: 97 F

## 2025-03-21 DIAGNOSIS — N95.2 VAGINAL ATROPHY: ICD-10-CM

## 2025-03-21 PROCEDURE — 1036F TOBACCO NON-USER: CPT | Performed by: NURSE PRACTITIONER

## 2025-03-21 PROCEDURE — 1159F MED LIST DOCD IN RCRD: CPT | Performed by: NURSE PRACTITIONER

## 2025-03-21 PROCEDURE — 1157F ADVNC CARE PLAN IN RCRD: CPT | Performed by: NURSE PRACTITIONER

## 2025-03-21 PROCEDURE — 99213 OFFICE O/P EST LOW 20 MIN: CPT | Performed by: NURSE PRACTITIONER

## 2025-03-21 PROCEDURE — G2211 COMPLEX E/M VISIT ADD ON: HCPCS | Performed by: NURSE PRACTITIONER

## 2025-03-21 RX ORDER — OLOPATADINE HYDROCHLORIDE OPHTHALMIC 2 MG/ML
1 SOLUTION OPHTHALMIC EVERY 24 HOURS
COMMUNITY

## 2025-03-21 RX ORDER — ESTRADIOL 0.1 MG/G
CREAM VAGINAL
Qty: 42.5 G | Refills: 3 | Status: SHIPPED | OUTPATIENT
Start: 2025-03-21

## 2025-03-21 ASSESSMENT — PATIENT HEALTH QUESTIONNAIRE - PHQ9
1. LITTLE INTEREST OR PLEASURE IN DOING THINGS: NOT AT ALL
2. FEELING DOWN, DEPRESSED OR HOPELESS: NOT AT ALL
SUM OF ALL RESPONSES TO PHQ9 QUESTIONS 1 AND 2: 0

## 2025-03-21 NOTE — PROGRESS NOTES
Subjective   Patient ID: Leni Graham is a 79 y.o. female who presents for pessary cleaning.  Last seen by Dr. Rocha 11/5/24;     HPI  79-year-old with atrophic vaginitis and vaginal prolapse; here today for pessary cleaning. Denies vaginal discharge, bleeding or concerns;         Review of Systems  Constitutional: No fever, No chills and No fatigue.   Eyes: No vision problems and No dryness of the eyes.   ENT: No dry mouth, No hearing loss and No nosebleeds.   Cardiovascular: No chest pain, No palpitations and No orthopnea.   Respiratory: No shortness of breath, No cough and No wheezing.   Gastrointestinal: No abdominal pain, No constipation, No nausea, No diarrhea, No vomiting and No melena.   Genitourinary: As noted in HPI.   Musculoskeletal: No back pain, No myalgias, No muscle weakness, No joint swelling and No leg edema.   Integumentary: No rashes, No skin lesion and No itching.   Neurological: No headache, No numbness and No dizziness.   Psychiatric: No sleep disturbances, No anxiety and No depression.   Endocrine: No hot flashes, No loss of hair and No hirsutism.   Hematologic/Lymphatic: No swollen glands, No tendency for easy bleeding and No tendency for easy bruising.   All other systems have been reviewed and are negative for complaint.        Objective   Physical Exam  General: Appears comfortable and in no apparent distress, well nourished  Head: Normocephalic, atraumatic  Neck: trachea midline  Respiratory: respirations unlabored, no wheezes, and no use of accessory muscles  Cardiovascular: at rest no dyspnea, well perfused  Skin: no visible rashes or lesions  Neurologic: grossly intact, oriented to person, place, and time  Psychiatric: mood and affect appropriate  Musculoskeletal: in chair for appt. no difficulty w upper body movement      The patient's #3 ring without support pessary was removed without difficulty. Erosion 1 cm diameter noted at apex c/w pessary; not using estrogen cream inside  vagina; only at opening;     Assessment/Plan     Ms. Leni Graham presented for pessary cleaning, pessary removed, erosion noted at apex c/w pessary, 1 cm diameter at apex; pessary left out, will start using vaginal estrogen in vagina as instructed;     Vaginal estrogen one gram nightly x 2 weeks, then twice weekly  Bring back pessary for insertion 4 weeks  Nurse line [phone]

## 2025-03-21 NOTE — PATIENT INSTRUCTIONS
Vaginal estrogen one gram nightly x 2 weeks, then twice weekly  Bring back pessary for insertion  Nurse line [phone]

## 2025-03-24 ENCOUNTER — TELEPHONE (OUTPATIENT)
Dept: UROLOGY | Facility: CLINIC | Age: 80
End: 2025-03-24
Payer: MEDICARE

## 2025-03-24 NOTE — TELEPHONE ENCOUNTER
Pt left message stating we took her pessary out last week and she is experiencing some mild bleeding and cramping and wants to know if that is normal. Please advise, thanks.

## 2025-03-26 NOTE — TELEPHONE ENCOUNTER
Pt left message returning Janneth's return call stating she does not feel like she has a UTI or that the blood is in her urine. She was just asking if the mild bleeding and cramping is normal.

## 2025-03-28 DIAGNOSIS — N30.00 ACUTE CYSTITIS WITHOUT HEMATURIA: ICD-10-CM

## 2025-03-28 DIAGNOSIS — N39.41 URGE URINARY INCONTINENCE: Primary | ICD-10-CM

## 2025-03-28 DIAGNOSIS — R39.9 UTI SYMPTOMS: ICD-10-CM

## 2025-03-28 RX ORDER — AMOXICILLIN AND CLAVULANATE POTASSIUM 875; 125 MG/1; MG/1
1 TABLET, FILM COATED ORAL 2 TIMES DAILY
Qty: 14 TABLET | Refills: 0 | Status: SHIPPED | OUTPATIENT
Start: 2025-03-28 | End: 2025-04-04

## 2025-03-28 NOTE — TELEPHONE ENCOUNTER
Pt left another message stating the cramping is getting worse and she thinks she has a UTI and she is asking for abx to be sent in. Janneth, please advise, thanks.

## 2025-03-29 LAB
APPEARANCE UR: CLEAR
BACTERIA #/AREA URNS HPF: ABNORMAL /HPF
BACTERIA UR CULT: ABNORMAL
BILIRUB UR QL STRIP: NEGATIVE
COLOR UR: ABNORMAL
GLUCOSE UR QL STRIP: NEGATIVE
HGB UR QL STRIP: NEGATIVE
HYALINE CASTS #/AREA URNS LPF: ABNORMAL /LPF
KETONES UR QL STRIP: NEGATIVE
LEUKOCYTE ESTERASE UR QL STRIP: ABNORMAL
NITRITE UR QL STRIP: POSITIVE
PH UR STRIP: 6 [PH] (ref 5–8)
PROT UR QL STRIP: NEGATIVE
RBC #/AREA URNS HPF: ABNORMAL /HPF
SERVICE CMNT-IMP: ABNORMAL
SP GR UR STRIP: 1.01 (ref 1–1.03)
SQUAMOUS #/AREA URNS HPF: ABNORMAL /HPF
WBC #/AREA URNS HPF: ABNORMAL /HPF

## 2025-03-31 ENCOUNTER — TELEPHONE (OUTPATIENT)
Dept: UROLOGY | Facility: CLINIC | Age: 80
End: 2025-03-31

## 2025-03-31 ENCOUNTER — APPOINTMENT (OUTPATIENT)
Dept: UROLOGY | Facility: CLINIC | Age: 80
End: 2025-03-31
Payer: MEDICARE

## 2025-03-31 NOTE — TELEPHONE ENCOUNTER
----- Message from Janneth Arana sent at 3/31/2025 11:28 AM EDT -----  Didn't grow out UTI, likely irritation r/t having pessary out. Hopeful she is using estrogen cream and we can get pessary back in soon.  ----- Message -----  From: [email or web address] Results In  Sent: 3/29/2025   4:21 AM EDT  To: BLANCHE [place]-CNP

## 2025-03-31 NOTE — TELEPHONE ENCOUNTER
"Pt returned call and left message stating she is not feeling well and is experiencing what she thinks is uterine pain and she is \"sitting on her uterus\". She states she is using the estradiol cream but feels like something is wrong and would like for Janneth to call her back, thanks.  "

## 2025-04-01 ENCOUNTER — OFFICE VISIT (OUTPATIENT)
Dept: UROLOGY | Facility: CLINIC | Age: 80
End: 2025-04-01
Payer: MEDICARE

## 2025-04-01 VITALS — SYSTOLIC BLOOD PRESSURE: 129 MMHG | DIASTOLIC BLOOD PRESSURE: 83 MMHG | TEMPERATURE: 97.1 F | HEART RATE: 66 BPM

## 2025-04-01 DIAGNOSIS — N39.41 URGE URINARY INCONTINENCE: ICD-10-CM

## 2025-04-01 DIAGNOSIS — Z46.89 PESSARY MAINTENANCE: ICD-10-CM

## 2025-04-01 DIAGNOSIS — N95.2 VAGINAL ATROPHY: Primary | ICD-10-CM

## 2025-04-01 PROCEDURE — 1159F MED LIST DOCD IN RCRD: CPT | Performed by: NURSE PRACTITIONER

## 2025-04-01 PROCEDURE — G2211 COMPLEX E/M VISIT ADD ON: HCPCS | Performed by: NURSE PRACTITIONER

## 2025-04-01 PROCEDURE — 1157F ADVNC CARE PLAN IN RCRD: CPT | Performed by: NURSE PRACTITIONER

## 2025-04-01 PROCEDURE — 99213 OFFICE O/P EST LOW 20 MIN: CPT | Performed by: NURSE PRACTITIONER

## 2025-04-01 NOTE — PATIENT INSTRUCTIONS
Inserted pessary  Continue estrogen cream, eventually may use vagifem  Follow up 6 weeks pessary check  Nurse line [phone]

## 2025-04-01 NOTE — PROGRESS NOTES
Subjective   Patient ID: Leni Graham is a 79 y.o. female who presents for pessary insertion.    HPI  79-year-old with atrophic vaginitis and vaginal prolapse; Last seen by Dr. Rocha 11/5/24; Last saw me on 3/21/25 when erosion noted c/w pessary; has left pessary out one week and cramping with prolapse noted, urine neg for UTI; would like pessary inserted sooner; brought her own pessary ring without support, has been using the estrogen cream daily and vaginal tissue has begun to heal with break from pessary as well;     Review of Systems  Constitutional: No fever, No chills and No fatigue.   Eyes: No vision problems and No dryness of the eyes.   ENT: No dry mouth, No hearing loss and No nosebleeds.   Cardiovascular: No chest pain, No palpitations and No orthopnea.   Respiratory: No shortness of breath, No cough and No wheezing.   Gastrointestinal: No abdominal pain, No constipation, No nausea, No diarrhea, No vomiting and No melena.   Genitourinary: As noted in HPI.   Musculoskeletal: No back pain, No myalgias, No muscle weakness, No joint swelling and No leg edema.   Integumentary: No rashes, No skin lesion and No itching.   Neurological: No headache, No numbness and No dizziness.   Psychiatric: No sleep disturbances, No anxiety and No depression.   Endocrine: No hot flashes, No loss of hair and No hirsutism.   Hematologic/Lymphatic: No swollen glands, No tendency for easy bleeding and No tendency for easy bruising.   All other systems have been reviewed and are negative for complaint.        Objective   Physical Exam  General: Appears comfortable and in no apparent distress, well nourished  Head: Normocephalic, atraumatic  Neck: trachea midline  Respiratory: respirations unlabored, no wheezes, and no use of accessory muscles  Cardiovascular: at rest no dyspnea, well perfused  Skin: no visible rashes or lesions  Neurologic: grossly intact, oriented to person, place, and time  Psychiatric: mood and affect  appropriate  Musculoskeletal: in chair for appt. no difficulty w upper body movement      The patient's #3 ring without support pessary was reinserted without difficulty. Erosion 1 cm diameter noted at apex c/w pessary has improved greatly in past week; tissue is being estrogenized and appears healthier than previously;     Assessment/Plan     Ms. Leni Graham presented for pessary insertion, agreed to keep using estrogen cream in vagina daily for next week and then 2-3 x per week, will consider vagifem once erosion healed;     Inserted pessary  Continue estrogen cream, eventually may use vagifem  Follow up 6 weeks pessary check  Nurse line [phone]    Please reschedule forllow up with me to 6 weeks cherry

## 2025-04-14 NOTE — PROGRESS NOTES
HISTORY OF PRESENT ILLNESS:  Leni Graham is a 79 y.o. female who presents today as a new patient. She is a patient of Dr. Rocha and has seen Janneth Arana. She has prolapse. She is bothered by it. She has a pessary and has improvement with it. She thinks she may have had a prolapse surgery in the past, in the 80s.  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  She did have urinary incontinence at that time also. She has a history of a tubal ligation.  she complains of both urinary urge and stress incontinence.  She has been managed with a pessary but may be interested in surgery.         Past Medical History  She has a past medical history of Acute candidiasis of vulva and vagina (06/12/2019), Anesthesia of skin (07/24/2019), Disorder of the skin and subcutaneous tissue, unspecified (04/09/2019), Encounter for fitting and adjustment of other specified devices (07/14/2021), Encounter for gynecological examination (general) (routine) without abnormal findings (11/06/2018), Encounter for other preprocedural examination (11/11/2019), Encounter for screening for nutritional disorder (09/09/2019), Hyperglycemia, unspecified (09/04/2019), Lymphocytic colitis (10/04/2016), Myalgia, other site (10/31/2016), Nausea with vomiting, unspecified (10/18/2017), Osteoarthritis of knee, unspecified (09/04/2019), Other abnormal findings on diagnostic imaging of central nervous system (01/11/2018), Other allergic rhinitis (03/12/2018), Other seasonal allergic rhinitis (10/06/2016), Overweight, Pain in left knee (12/13/2018), Pain in left shoulder (07/07/2017), Pain in leg, unspecified (09/11/2018), Pain in right hip (10/17/2019), Pain in right knee (06/28/2018), Personal history of other diseases of the circulatory system, Personal history of other diseases of the circulatory system, Personal history of other diseases of the digestive system (12/01/2015), Personal history of other diseases of the  digestive system (09/22/2022), Personal history of other diseases of the respiratory system (05/23/2019), Personal history of other diseases of the respiratory system (01/10/2019), Personal history of other diseases of the respiratory system (10/17/2019), Personal history of other diseases of the respiratory system, Personal history of other endocrine, nutritional and metabolic disease, Personal history of other endocrine, nutritional and metabolic disease (03/06/2018), Personal history of other mental and behavioral disorders (09/12/2019), Personal history of other mental and behavioral disorders (05/17/2016), Personal history of other specified conditions (10/15/2018), Personal history of other specified conditions (12/18/2017), Personal history of other specified conditions (10/24/2019), Personal history of other specified conditions (06/07/2019), Presence of right artificial knee joint (09/11/2018), Somnolence (07/24/2019), Strain of muscle, fascia and tendon of lower back, initial encounter (04/27/2018), Toxic gastroenteritis and colitis (10/18/2017), Unilateral primary osteoarthritis, left knee (11/26/2019), Unilateral primary osteoarthritis, left knee (08/14/2019), and Unilateral primary osteoarthritis, right knee (06/28/2018).    Surgical History  She has a past surgical history that includes Tubal ligation (05/12/2015); Other surgical history (10/24/2017); Other surgical history (06/10/2022); Cataract extraction (09/28/2016); Other surgical history (12/29/2015); Cholecystectomy (12/29/2015); MR angio head wo IV contrast (11/15/2017); and MR angio neck wo IV contrast (11/15/2017).     Social History  She reports that she has never smoked. She has never used smokeless tobacco. She reports that she does not currently use alcohol. She reports that she does not use drugs.    Family History  No family history on file.     Allergies  Acetaminophen, Adhesive tape-silicones, Baclofen, Duloxetine, Ezetimibe,  "Meloxicam, Nitrofurantoin, Nitrofurantoin monohyd/m-cryst, Nortriptyline, Oxcarbazepine, Oxycodone-acetaminophen, Pitavastatin, Pseudoephedrine, Simvastatin, Statins-hmg-coa reductase inhibitors, Sulfa (sulfonamide antibiotics), Sulfamethoxazole-trimethoprim, Tramadol, Cephalexin, Diphenhydramine, and Levofloxacin      A comprehensive 10+ review of systems was negative except for: see hpi                          PHYSICAL EXAMINATION:  BP Readings from Last 3 Encounters:   04/01/25 129/83   03/21/25 124/70   12/01/24 140/69      Wt Readings from Last 3 Encounters:   12/01/24 79.4 kg (175 lb)   11/05/24 78.2 kg (172 lb 6.4 oz)   07/18/24 79.8 kg (176 lb)      BMI: Estimated body mass index is 33.07 kg/m² as calculated from the following:    Height as of 3/21/25: 1.549 m (5' 1\").    Weight as of 12/1/24: 79.4 kg (175 lb).  BSA: Estimated body surface area is 1.85 meters squared as calculated from the following:    Height as of 3/21/25: 1.549 m (5' 1\").    Weight as of 12/1/24: 79.4 kg (175 lb).  HEENT: Normocephalic, atraumatic, PER EOMI, nonicteric, trachea normal, thyroid normal, oropharynx normal.  CARDIAC: regular rate & rhythm, S1 & S2 normal.  No heaves, thrills, gallops or murmurs.  LUNGS: Clear to auscultation, no spinal or CV tenderness.  EXTREMITIES: No evidence of cyanosis, clubbing or edema.        Pelvic:  Chaperone for pelvic exam:   Genitourinary:  normal external genitalia, Bartholin's glands, [place]'s glands negative,   Urethra normal meatus, non-tender, no periurethral mass  Vaginal mucosa  normal  Adnexae  negative nontender, no masses  Atrophy positive    CST negative  Pelvic floor muscle contraction  4/5    POP-Q (in supine position):       Aa 0     Ba 0     C -3              gh 3     pb 3     tvl 8              Ap -1     Bp -1     D     Rectal: no hemorrhoids, fissures or masses.    PVR (by ultrasound):  0        Assessment:  79 y.o. female presents as a new patient with prolapse, UUI. Hx of Maddie "       Prolapse:    I discussed treatment options including pessary and surgery, with regard to surgery discussed SCP vs native tissue repair; for SCP the failure rate is 5-10%, but associated with mesh complications including erosion <1% and SBO <0.5% vs native tissue repair which is associated with 20-30% failure rate, but no long term risk of complications and only~15% requiring additional treatment.    She has a #5 ring without support, this was changed without difficulty she was given handouts about surgery and will let me know what she wants to do          Overactive bladder:   -We discussed strategies to reduce symptoms. We discussed reducing caffeine and carbonated drinks could help with symptoms.     -We discussed managing fluids.     -We discussed third line therapy: Botox vs Interstim: both have similar efficacy 80% patients reports >50% improvement, Botox associated with 5% risk of incomplete emptying, increase in UTI and will require re-injection in 6-9 months; and as early as 3 months. Interstim is a staged procedure, 2 weeks apart, consisting first of lead implantation then internalization of IPG if there is improvement. Interstim is associated with lead migration, explanation, infection and bleeding, though risks are all <5%. If she decides to go forward with this we will order urodynamics.     Myrbetriq trial          Follow up in 4 months       All questions and concerns were answered and addressed.  The patient expressed understanding and agrees with the plan.     Omar Chilel MD    Scribe Attestation  By signing my name below, IJanae Scribe   attest that this documentation has been prepared under the direction and in the presence of Omar Chilel MD.

## 2025-04-15 ENCOUNTER — OFFICE VISIT (OUTPATIENT)
Facility: HOSPITAL | Age: 80
End: 2025-04-15
Payer: MEDICARE

## 2025-04-15 DIAGNOSIS — N39.3 SUI (STRESS URINARY INCONTINENCE, FEMALE): Primary | ICD-10-CM

## 2025-04-15 DIAGNOSIS — N32.81 OAB (OVERACTIVE BLADDER): ICD-10-CM

## 2025-04-15 PROCEDURE — 1157F ADVNC CARE PLAN IN RCRD: CPT | Performed by: STUDENT IN AN ORGANIZED HEALTH CARE EDUCATION/TRAINING PROGRAM

## 2025-04-15 PROCEDURE — 99204 OFFICE O/P NEW MOD 45 MIN: CPT | Performed by: STUDENT IN AN ORGANIZED HEALTH CARE EDUCATION/TRAINING PROGRAM

## 2025-04-15 PROCEDURE — 99214 OFFICE O/P EST MOD 30 MIN: CPT | Mod: 25 | Performed by: STUDENT IN AN ORGANIZED HEALTH CARE EDUCATION/TRAINING PROGRAM

## 2025-04-15 PROCEDURE — 57160 INSERT PESSARY/OTHER DEVICE: CPT | Performed by: STUDENT IN AN ORGANIZED HEALTH CARE EDUCATION/TRAINING PROGRAM

## 2025-04-15 PROCEDURE — 1159F MED LIST DOCD IN RCRD: CPT | Performed by: STUDENT IN AN ORGANIZED HEALTH CARE EDUCATION/TRAINING PROGRAM

## 2025-04-15 PROCEDURE — 51798 US URINE CAPACITY MEASURE: CPT | Performed by: STUDENT IN AN ORGANIZED HEALTH CARE EDUCATION/TRAINING PROGRAM

## 2025-04-15 RX ORDER — DEXTROMETHORPHAN HYDROBROMIDE, GUAIFENESIN 5; 100 MG/5ML; MG/5ML
650 LIQUID ORAL EVERY 8 HOURS PRN
COMMUNITY

## 2025-04-15 RX ORDER — MIRABEGRON 50 MG/1
50 TABLET, FILM COATED, EXTENDED RELEASE ORAL DAILY
Qty: 90 TABLET | Refills: 3 | Status: SHIPPED | OUTPATIENT
Start: 2025-04-15 | End: 2026-04-15

## 2025-04-15 SDOH — ECONOMIC STABILITY: FOOD INSECURITY: WITHIN THE PAST 12 MONTHS, YOU WORRIED THAT YOUR FOOD WOULD RUN OUT BEFORE YOU GOT MONEY TO BUY MORE.: NEVER TRUE

## 2025-04-15 SDOH — ECONOMIC STABILITY: FOOD INSECURITY: WITHIN THE PAST 12 MONTHS, THE FOOD YOU BOUGHT JUST DIDN'T LAST AND YOU DIDN'T HAVE MONEY TO GET MORE.: NEVER TRUE

## 2025-04-15 ASSESSMENT — PATIENT HEALTH QUESTIONNAIRE - PHQ9
1. LITTLE INTEREST OR PLEASURE IN DOING THINGS: NOT AT ALL
SUM OF ALL RESPONSES TO PHQ9 QUESTIONS 1 AND 2: 0
2. FEELING DOWN, DEPRESSED OR HOPELESS: NOT AT ALL

## 2025-04-15 ASSESSMENT — COLUMBIA-SUICIDE SEVERITY RATING SCALE - C-SSRS
1. IN THE PAST MONTH, HAVE YOU WISHED YOU WERE DEAD OR WISHED YOU COULD GO TO SLEEP AND NOT WAKE UP?: NO
2. HAVE YOU ACTUALLY HAD ANY THOUGHTS OF KILLING YOURSELF?: NO

## 2025-04-15 ASSESSMENT — ENCOUNTER SYMPTOMS
OCCASIONAL FEELINGS OF UNSTEADINESS: 1
DEPRESSION: 0
LOSS OF SENSATION IN FEET: 0

## 2025-04-18 ENCOUNTER — APPOINTMENT (OUTPATIENT)
Dept: UROLOGY | Facility: CLINIC | Age: 80
End: 2025-04-18
Payer: MEDICARE

## 2025-04-24 ENCOUNTER — TELEPHONE (OUTPATIENT)
Dept: UROLOGY | Facility: CLINIC | Age: 80
End: 2025-04-24
Payer: MEDICARE

## 2025-04-24 DIAGNOSIS — N32.81 OAB (OVERACTIVE BLADDER): Primary | ICD-10-CM

## 2025-04-25 DIAGNOSIS — R39.9 UTI SYMPTOMS: ICD-10-CM

## 2025-04-25 DIAGNOSIS — N39.41 URGE URINARY INCONTINENCE: ICD-10-CM

## 2025-04-29 ENCOUNTER — APPOINTMENT (OUTPATIENT)
Dept: PHARMACY | Facility: HOSPITAL | Age: 80
End: 2025-04-29
Payer: MEDICARE

## 2025-04-29 DIAGNOSIS — N32.81 OAB (OVERACTIVE BLADDER): Primary | ICD-10-CM

## 2025-04-29 NOTE — PROGRESS NOTES
"  Patient ID: Leni Graham is a 79 y.o. female who presents for OAB     Pt is here for First appointment.     Referring Provider: Omar Chilel MD  Reason for Referral: OAB    Subjective     Medication and allergy reconciliation completed     Drug Interactions  No relevant drug interactions were noted.    Medication System Management  Patient's preferred pharmacy:     [id number] - [place], [place] - [address]  [address] [facility] [address]  Phone: [phone] Fax: [phone]    Adherence/Organization: pt reported taking advair as needed. Did not discuss, may follow up in the future  Affordability/Accessibility: Assess for [facility]      Medications Ordered Prior to Encounter[1]        Vaginal Symptoms  Vaginal Dryness: no  Dysuria (pain, burning, stinging, or itching with urination): no  Vaginal and/or vulvar irritation/itching: no  Recurrent urinary tract infections: not recently  No results found for: \"ESTRADIOLFRE\", \"PROGESTERONE\", \"ESTRADIOL\", \"FSH\"  Pessary: yes    Current Treatment:   Estradiol cream confirmed using applicator    Urinary Symptoms  Medications that may contribute to symptoms:   none  Any history of:   Narrow-angle glaucoma: no  Impaired gastric emptying: no, \"diarrhea sometimes, gas pain\"  Urinary retention: no, bladder suspension back in 1988    Prediabetes or diabetes:  no DM dx  Lab Results   Component Value Date    GLUCOSE 100 (H) 07/05/2024    HGBA1C 5.6 08/04/2022    HGBA1C 5.7 09/12/2019    HGBA1C 5.4 12/04/2018     No results found for: \"LEPTIN\", \"INSULFAST\", \"GLUF\"  Frequently of bowel movement: daily  Tobacco use: no  How many protective undergarments are you utilizing daily: 5-6/day  Recommend Coloplast Marlene Protect moisture barrier cream for incontinence associated skin irritation/breakdown.   Work out in the morning, use 2-3 pads at night    What medications have been tried/stopped? " "  Trospium  solifenacin  Current medication? Not taking any meds  When started?   Side effects?   Improvement in symptoms?       Cardiovascular Health  The ASCVD Risk score (Dutch DK, et al., 2019) failed to calculate for the following reasons:    The 2019 ASCVD risk score is only valid for ages 40 to 79    Lab Results   Component Value Date    CHOL 258 (H) 09/30/2020     Lab Results   Component Value Date    HDL 65.0 09/30/2020     No results found for: \"LDLCALC\"  Lab Results   Component Value Date    TRIG 137 09/30/2020     No components found for: \"CHOLHDL\"     Vitals  BP Readings from Last 2 Encounters:   04/01/25 129/83   03/21/25 124/70     BMI Readings from Last 1 Encounters:   03/21/25 33.07 kg/m²        BMP  Lab Results   Component Value Date    CALCIUM 9.5 07/05/2024     (L) 07/05/2024    K 4.4 07/05/2024    CO2 28 07/05/2024    CL 97 (L) 07/05/2024    BUN 16 07/05/2024    CREATININE 0.69 07/05/2024    EGFR 88 07/05/2024     Make sure GFR >15 ml/min or dose adjust    LFTs  Lab Results   Component Value Date    ALT 9 05/17/2024    AST 12 05/17/2024    ALKPHOS 97 05/17/2024    BILITOT 0.6 05/17/2024         Assessment/Plan     Patient is experiencing urinary frequency, urgency, and incontinence. Patient is currently not taking any medication for her urinary symptoms  Has tried before none. Was prescribed solifenacin and trospium previously but not started  Discussed both Myrbetriq and Gemtesa with patient. Patient would benefit more from Gemtesa given less side effects    Gemtesa   Discussed MOA: works by activating beta-3 adrenergic receptors in the bladder resulting in relaxation of the detrusor smooth muscle during the urine storage phase, thus increasing bladder capacity.  Provided education on administration and potential side effects including but not limited to hypertension 9%, hot flashes <2%, constipation/diarrhea <2%, dry mouth <2%, and headache <4%.   Gemtesa (vibegron) starts working " almost immediately - within a few days of first taking it, with noticeable improvements in urinary urgency, frequency, and incontinence noted in clinical trials at 2 weeks which were reported as significant by 12 weeks.     Patient Assistance Program (PAP)    Application for program to be submitted for the following medications: gemtesa    Prescription Insurance:  Yes   Paid Test Claim:  Yes   County of Permanent Address:  [place]   Members of Household:  1   Files Taxes:  Yes     Patient will be faxing financial information to pharmacist directly at at this [facility] inbox.    Patient verbally reports monthly or yearly income which is less than 400% federal poverty level    Patient aware this process may take up to 6 weeks.     If approved medication must be filled through [facility] and MEDICATION WILL BE MAILED TO PATIENT.    START  Gemtesa 75 mg once daily    Follow-up: 5/15/2025 3:20 PM      Time spent with pt: Total length of time 40 (minutes) of the encounter and more than 50% was spent counseling the patient.      Jaelyn Goldman PharmD   Meds Clinical Pharmacist  Phone: [phone]     Continue all meds under the continuation of care with the referring provider and clinical pharmacy team.    Verbal consent to manage patient's drug therapy was obtained from the patient. They were informed they may decline to participate or withdraw from participation in pharmacy services at any time.         [1]   Current Outpatient Medications on File Prior to Visit   Medication Sig Dispense Refill    acetaminophen (Tylenol 8 HOUR) 650 mg ER tablet Take 1 tablet (650 mg) by mouth every 8 hours if needed for mild pain (1 - 3). Do not crush, chew, or split.      albuterol 2.5 mg /3 mL (0.083 %) nebulizer solution USE 3 ML VIA NEBULIZER EVERY 6 HOURS AS NEEDED FOR WHEEZING OR SHORTNESS OF BREATH      aspirin 81 mg EC tablet Take 1 tablet (81 mg) by mouth once daily.      clobetasol (Temovate) 0.05 % ointment Apply  topically once daily. Apply to the external vaginal tissues daily for 2 weeks. (Patient not taking: Reported on 4/15/2025) 30 g 1    coffee xt/phosphatidyl serine (NEURIVA ORIGINAL ORAL) Take by mouth once daily.      diazePAM (Valium) 5 mg tablet Take 1 tablet (5 mg) by mouth as needed at bedtime for muscle spasms for up to 7 days. 7 tablet 0    escitalopram (Lexapro) 10 mg tablet Take 1 tablet (10 mg) by mouth early in the morning..      estradiol (Estrace) 0.01 % (0.1 mg/gram) vaginal cream 1 gram nightly x 2 weeks, then 2 x per week 42.5 g 3    fluticasone propion-salmeteroL (Advair Diskus) 100-50 mcg/dose diskus inhaler Inhale 1 puff twice a day.      gabapentin (Neurontin) 100 mg capsule TAKE 1 CAPSULE BY MOUTH DAILY AT BEDTIME FOR 7 DAYS THEN TAKE 2 CAPSULES BY MOUTH DAILY AT BEDTIME FOR 21 DAYS      lubricating eye drops ophthalmic solution 1 drop if needed for dry eyes.      mirabegron (Myrbetriq) 50 mg tablet extended release 24 hr 24 hr tablet Take 1 tablet (50 mg) by mouth once daily. 90 tablet 3    olopatadine (Pataday) 0.2 % ophthalmic solution Administer 1 drop into affected eye(s) once every 24 hours.      omeprazole (PriLOSEC) 20 mg DR capsule Take 1 capsule (20 mg) by mouth once daily. 90 capsule 2    sodium chloride (Ocean) 0.65 % nasal spray Administer 1 spray into each nostril if needed for congestion.      vit C/E/Zn/coppr/lutein/zeaxan (EYE HEALTH VITAMIN-MINERAL ORAL) Take by mouth.       No current facility-administered medications on file prior to visit.

## 2025-05-05 PROBLEM — N39.0 UTI (URINARY TRACT INFECTION): Status: RESOLVED | Noted: 2023-07-04 | Resolved: 2025-05-05

## 2025-05-05 PROBLEM — E66.811 CLASS 1 OBESITY WITH BODY MASS INDEX (BMI) OF 33.0 TO 33.9 IN ADULT: Status: ACTIVE | Noted: 2025-05-05

## 2025-05-05 PROBLEM — K44.9 HIATAL HERNIA: Status: RESOLVED | Noted: 2024-03-28 | Resolved: 2025-05-05

## 2025-05-05 PROBLEM — R26.89 IMBALANCE: Status: ACTIVE | Noted: 2024-07-11

## 2025-05-05 PROBLEM — K29.00 ACUTE GASTRITIS: Status: RESOLVED | Noted: 2024-03-06 | Resolved: 2025-05-05

## 2025-05-05 PROBLEM — R11.12 PROJECTILE VOMITING WITH NAUSEA: Status: RESOLVED | Noted: 2024-03-06 | Resolved: 2025-05-05

## 2025-05-05 PROBLEM — R30.0 DYSURIA: Status: RESOLVED | Noted: 2018-10-02 | Resolved: 2025-05-05

## 2025-05-05 PROBLEM — R20.0 NUMBNESS AND TINGLING IN BOTH HANDS: Status: ACTIVE | Noted: 2024-07-11

## 2025-05-05 PROBLEM — R26.2 DIFFICULTY WALKING: Status: RESOLVED | Noted: 2023-09-28 | Resolved: 2025-05-05

## 2025-05-05 PROBLEM — R20.2 NUMBNESS AND TINGLING IN BOTH HANDS: Status: ACTIVE | Noted: 2024-07-11

## 2025-05-05 PROBLEM — M54.12 CERVICAL RADICULOPATHY: Status: ACTIVE | Noted: 2024-07-11

## 2025-05-05 PROBLEM — R74.01 TRANSAMINITIS: Status: RESOLVED | Noted: 2023-07-12 | Resolved: 2025-05-05

## 2025-05-05 PROBLEM — S80.00XA CONTUSION OF KNEE AND LOWER LEG: Status: RESOLVED | Noted: 2024-03-28 | Resolved: 2025-05-05

## 2025-05-05 PROBLEM — M70.61 TROCHANTERIC BURSITIS OF RIGHT HIP: Status: RESOLVED | Noted: 2024-03-28 | Resolved: 2025-05-05

## 2025-05-05 PROBLEM — R32 ENURESIS: Status: RESOLVED | Noted: 2024-03-28 | Resolved: 2025-05-05

## 2025-05-05 PROBLEM — S80.10XA CONTUSION OF KNEE AND LOWER LEG: Status: RESOLVED | Noted: 2024-03-28 | Resolved: 2025-05-05

## 2025-05-05 PROBLEM — K29.00 ACUTE GASTRITIS WITHOUT HEMORRHAGE: Status: RESOLVED | Noted: 2024-03-06 | Resolved: 2025-05-05

## 2025-05-14 NOTE — PROGRESS NOTES
"  Patient ID: Leni Graham is a 79 y.o. female who presents for OAB     Pt is here for Follow Up appointment.     Referring Provider: Omar Chilel MD  Reason for Referral: OAB, gemtesa assistance    Subjective     Medication and allergy reconciliation completed     Drug Interactions  No relevant drug interactions were noted.    Medication System Management  Patient's preferred pharmacy:     [id number] - [place], [place] - [address]  [address]  [address] [facility] [address]  Phone: [phone] Fax: [phone]    Adherence/Organization: pt reported taking advair as needed. Did not discuss, may follow up in the future  Affordability/Accessibility: Assess for [facility]      Medications Ordered Prior to Encounter[1]        Vaginal Symptoms  Vaginal Dryness: no  Dysuria (pain, burning, stinging, or itching with urination): no  Vaginal and/or vulvar irritation/itching: no  Recurrent urinary tract infections: not recently  No results found for: \"ESTRADIOLFRE\", \"PROGESTERONE\", \"ESTRADIOL\", \"FSH\"  Pessary: yes    Current Treatment:   Estradiol cream confirmed using applicator    Urinary Symptoms  Medications that may contribute to symptoms:   none  Any history of:   Narrow-angle glaucoma: no  Impaired gastric emptying: no, \"diarrhea sometimes, gas pain\"  Urinary retention: no, bladder suspension back in 1988    Prediabetes or diabetes:  no DM dx  Lab Results   Component Value Date    GLUCOSE 100 (H) 07/05/2024    HGBA1C 5.6 08/04/2022    HGBA1C 5.7 09/12/2019    HGBA1C 5.4 12/04/2018     No results found for: \"LEPTIN\", \"INSULFAST\", \"GLUF\"  Frequently of bowel movement: daily  Tobacco use: no  How many protective undergarments are you utilizing daily: 5-6/day  Recommend Coloplast Marlene Protect moisture barrier cream for incontinence associated skin irritation/breakdown.   Work out in the morning, use 2-3 pads at night    What medications have been tried/stopped? " "  Trospium  solifenacin  Current medication? Not taking any meds, pending Gemtesa  When started?   Side effects?   Improvement in symptoms?       Cardiovascular Health  The ASCVD Risk score (Dutch TOMLINSON, et al., 2019) failed to calculate for the following reasons:    The 2019 ASCVD risk score is only valid for ages 40 to 79    Lab Results   Component Value Date    CHOL 258 (H) 09/30/2020     Lab Results   Component Value Date    HDL 65.0 09/30/2020     No results found for: \"LDLCALC\"  Lab Results   Component Value Date    TRIG 137 09/30/2020     No components found for: \"CHOLHDL\"     Vitals  BP Readings from Last 2 Encounters:   04/01/25 129/83   03/21/25 124/70     BMI Readings from Last 1 Encounters:   03/21/25 33.07 kg/m²        BMP  Lab Results   Component Value Date    CALCIUM 9.5 07/05/2024     (L) 07/05/2024    K 4.4 07/05/2024    CO2 28 07/05/2024    CL 97 (L) 07/05/2024    BUN 16 07/05/2024    CREATININE 0.69 07/05/2024    EGFR 88 07/05/2024     Make sure GFR >15 ml/min or dose adjust    LFTs  Lab Results   Component Value Date    ALT 9 05/17/2024    AST 12 05/17/2024    ALKPHOS 97 05/17/2024    BILITOT 0.6 05/17/2024         Assessment/Plan     Patient is experiencing urinary frequency, urgency, and incontinence. Patient is currently not taking any medication for her urinary symptoms  Has tried before none. Was prescribed solifenacin and trospium previously but not started  Discussed both Myrbetriq and Gemtesa with patient. Patient would benefit more from Gemtesa given less side effects  Rediscuss  PAP with patient today. Patient is out of town and requested follow up in 1 month    Gemtesa   Discussed MOA: works by activating beta-3 adrenergic receptors in the bladder resulting in relaxation of the detrusor smooth muscle during the urine storage phase, thus increasing bladder capacity.  Provided education on administration and potential side effects including but not limited to hypertension 9%, hot " flashes <2%, constipation/diarrhea <2%, dry mouth <2%, and headache <4%.   Gemtesa (vibegron) starts working almost immediately - within a few days of first taking it, with noticeable improvements in urinary urgency, frequency, and incontinence noted in clinical trials at 2 weeks which were reported as significant by 12 weeks.     Patient Assistance Program (PAP)    Application for program to be submitted for the following medications: Gemtesa    Prescription Insurance:  Yes   Paid Test Claim:  Yes   County of Permanent Address:  [place]   Members of Household:  1   Files Taxes:  Yes     Patient will be faxing financial information to pharmacist directly at at this [facility] inbox.    Patient verbally reports monthly or yearly income which is less than 400% federal poverty level    Patient aware this process may take up to 6 weeks.     If approved medication must be filled through [facility] and MEDICATION WILL BE MAILED TO PATIENT.    START  Gemtesa 75 mg once daily. Pending [facility]    Follow-up: 6/26/2025 3:00 PM      Time spent with pt: Total length of time 40 (minutes) of the encounter and more than 50% was spent counseling the patient.      Jaelyn Goldman, PharmD   Meds Clinical Pharmacist  Phone: [phone]     Continue all meds under the continuation of care with the referring provider and clinical pharmacy team.    Verbal consent to manage patient's drug therapy was obtained from the patient. They were informed they may decline to participate or withdraw from participation in pharmacy services at any time.         [1]   Current Outpatient Medications on File Prior to Visit   Medication Sig Dispense Refill    acetaminophen (Tylenol 8 HOUR) 650 mg ER tablet Take 1 tablet (650 mg) by mouth every 8 hours if needed for mild pain (1 - 3). Do not crush, chew, or split.      albuterol 2.5 mg /3 mL (0.083 %) nebulizer solution Take 3 mL (2.5 mg) by nebulization every 4 hours if needed for wheezing.      aspirin  81 mg EC tablet Take 1 tablet (81 mg) by mouth once daily.      clobetasol (Temovate) 0.05 % ointment Apply topically once daily. Apply to the external vaginal tissues daily for 2 weeks. (Patient not taking: Reported on 4/29/2025) 30 g 1    coffee xt/phosphatidyl serine (NEURIVA ORIGINAL ORAL) Take 1 tablet by mouth once daily.      diazePAM (Valium) 5 mg tablet Take 1 tablet (5 mg) by mouth as needed at bedtime for muscle spasms for up to 7 days. (Patient not taking: Reported on 4/29/2025) 7 tablet 0    escitalopram (Lexapro) 10 mg tablet Take 1 tablet (10 mg) by mouth early in the morning..      estradiol (Estrace) 0.01 % (0.1 mg/gram) vaginal cream 1 gram nightly x 2 weeks, then 2 x per week 42.5 g 3    fluticasone propion-salmeteroL (Advair Diskus) 100-50 mcg/dose diskus inhaler Inhale 1 puff twice a day. (Patient taking differently: Inhale 1 puff once daily as needed.)      gabapentin (Neurontin) 100 mg capsule TAKE 1 CAPSULE BY MOUTH DAILY AT BEDTIME FOR 7 DAYS THEN TAKE 2 CAPSULES BY MOUTH DAILY AT BEDTIME FOR 21 DAYS (Patient taking differently: 1 capsule daily)      lubricating eye drops ophthalmic solution Administer 1 drop into both eyes if needed for dry eyes. refresh      mirabegron (Myrbetriq) 50 mg tablet extended release 24 hr 24 hr tablet Take 1 tablet (50 mg) by mouth once daily. (Patient not taking: Reported on 4/29/2025) 90 tablet 3    olopatadine (Pataday) 0.2 % ophthalmic solution Administer 1 drop into affected eye(s) once daily as needed for allergies.      omeprazole (PriLOSEC) 20 mg DR capsule Take 1 capsule (20 mg) by mouth once daily. (Patient taking differently: Take 1 capsule (20 mg) by mouth once daily as needed (acid reflux).) 90 capsule 2    sodium chloride (Ocean) 0.65 % nasal spray Administer 1 spray into each nostril if needed for congestion.      vit C/E/Zn/coppr/lutein/zeaxan (EYE HEALTH VITAMIN-MINERAL ORAL) Take 1 tablet by mouth once daily.       No current  facility-administered medications on file prior to visit.

## 2025-05-15 ENCOUNTER — APPOINTMENT (OUTPATIENT)
Dept: PHARMACY | Facility: HOSPITAL | Age: 80
End: 2025-05-15
Payer: MEDICARE

## 2025-05-15 DIAGNOSIS — N32.81 OAB (OVERACTIVE BLADDER): ICD-10-CM

## 2025-05-21 ENCOUNTER — APPOINTMENT (OUTPATIENT)
Dept: CARDIOLOGY | Facility: CLINIC | Age: 80
End: 2025-05-21
Payer: MEDICARE

## 2025-05-21 VITALS
DIASTOLIC BLOOD PRESSURE: 82 MMHG | BODY MASS INDEX: 33.42 KG/M2 | SYSTOLIC BLOOD PRESSURE: 120 MMHG | OXYGEN SATURATION: 94 % | WEIGHT: 177 LBS | HEIGHT: 61 IN | HEART RATE: 73 BPM

## 2025-05-21 DIAGNOSIS — R07.89 OTHER CHEST PAIN: ICD-10-CM

## 2025-05-21 DIAGNOSIS — E78.2 MIXED HYPERLIPIDEMIA: Primary | ICD-10-CM

## 2025-05-21 DIAGNOSIS — R06.02 SHORTNESS OF BREATH ON EXERTION: ICD-10-CM

## 2025-05-21 PROCEDURE — 1159F MED LIST DOCD IN RCRD: CPT | Performed by: INTERNAL MEDICINE

## 2025-05-21 PROCEDURE — 1036F TOBACCO NON-USER: CPT | Performed by: INTERNAL MEDICINE

## 2025-05-21 PROCEDURE — 93000 ELECTROCARDIOGRAM COMPLETE: CPT | Performed by: INTERNAL MEDICINE

## 2025-05-21 PROCEDURE — 99213 OFFICE O/P EST LOW 20 MIN: CPT | Performed by: INTERNAL MEDICINE

## 2025-05-21 NOTE — ASSESSMENT & PLAN NOTE
The patient has a history of statin intolerance. She states that she has been on 3 different statins and they all had to be discontinued because of side effects. We discussed the possibility of adding a PCSK9 inhibitor. A calcium score will be obtained.

## 2025-05-21 NOTE — ASSESSMENT & PLAN NOTE
The chest pain description is very atypical and likely secondary to GERD. A stress test is not recommended. Today's ECG shows sinus rhythm with no ischemic changes.

## 2025-05-23 DIAGNOSIS — N39.41 URGE URINARY INCONTINENCE: ICD-10-CM

## 2025-05-23 DIAGNOSIS — R39.9 UTI SYMPTOMS: ICD-10-CM

## 2025-05-27 ENCOUNTER — APPOINTMENT (OUTPATIENT)
Dept: UROLOGY | Facility: CLINIC | Age: 80
End: 2025-05-27
Payer: MEDICARE

## 2025-06-26 ENCOUNTER — APPOINTMENT (OUTPATIENT)
Dept: PHARMACY | Facility: HOSPITAL | Age: 80
End: 2025-06-26
Payer: MEDICARE

## 2025-06-26 DIAGNOSIS — N32.81 OAB (OVERACTIVE BLADDER): Primary | ICD-10-CM

## 2025-06-26 RX ORDER — ESTRADIOL 10 UG/1
TABLET, FILM COATED VAGINAL
Qty: 14 TABLET | Refills: 1 | Status: SHIPPED | OUTPATIENT
Start: 2025-06-26

## 2025-06-26 NOTE — PROGRESS NOTES
"  Patient ID: Leni Graham is a 79 y.o. female who presents for OAB     Pt is here for Follow Up appointment.     Referring Provider: Omar Chilel MD  Reason for Referral: OAB, gemtesa assistance    Subjective     Medication and allergy reconciliation completed     Drug Interactions  No relevant drug interactions were noted.    Medication System Management  Patient's preferred pharmacy:     [id number] - [place], [place] - [address]  [address]  [address] [facility] [address]  Phone: [phone] Fax: [phone]    Adherence/Organization: pt reported taking advair as needed. Did not discuss, may follow up in the future  Affordability/Accessibility: Assess for [facility]      Medications Ordered Prior to Encounter[1]      Patient noted cramps at uterus, started a couple days ago. Also endorsed back pain, taking estrace with little relief    Vaginal Symptoms  Vaginal Dryness: no  Dysuria (pain, burning, stinging, or itching with urination): no  Vaginal and/or vulvar irritation/itching: no  Recurrent urinary tract infections: not recently  No results found for: \"ESTRADIOLFRE\", \"PROGESTERONE\", \"ESTRADIOL\", \"FSH\"  Pessary: yes    Current Treatment:   Estradiol cream confirmed using applicator, every other night    Urinary Symptoms  Medications that may contribute to symptoms:   none  Any history of:   Narrow-angle glaucoma: no  Impaired gastric emptying: no, \"diarrhea sometimes, gas pain\"  Urinary retention: no, bladder suspension back in 1988    Prediabetes or diabetes:  no DM dx  Lab Results   Component Value Date    GLUCOSE 100 (H) 07/05/2024    HGBA1C 5.6 08/04/2022    HGBA1C 5.7 09/12/2019    HGBA1C 5.4 12/04/2018     No results found for: \"LEPTIN\", \"INSULFAST\", \"GLUF\"  Frequently of bowel movement: daily  Tobacco use: no  How many protective undergarments are you utilizing daily: 5-6/day  Recommend Coloplast Marlene Protect moisture barrier cream for incontinence " "associated skin irritation/breakdown.   Work out in the morning, use 2-3 pads at night    What medications have been tried/stopped?   Trospium  Solifenacin  Mybetriq - allergies  Current medication? Not taking any meds, pending Gemtesa  When started?   Side effects?   Improvement in symptoms?       Cardiovascular Health  The ASCVD Risk score (Dutch TOMLINSON, et al., 2019) failed to calculate for the following reasons:    The 2019 ASCVD risk score is only valid for ages 40 to 79    Lab Results   Component Value Date    CHOL 258 (H) 09/30/2020     Lab Results   Component Value Date    HDL 65.0 09/30/2020     No results found for: \"LDLCALC\"  Lab Results   Component Value Date    TRIG 137 09/30/2020     No components found for: \"CHOLHDL\"     Vitals  BP Readings from Last 2 Encounters:   05/21/25 120/82   04/01/25 129/83     BMI Readings from Last 1 Encounters:   05/21/25 33.44 kg/m²        BMP  Lab Results   Component Value Date    CALCIUM 9.5 07/05/2024     (L) 07/05/2024    K 4.4 07/05/2024    CO2 28 07/05/2024    CL 97 (L) 07/05/2024    BUN 16 07/05/2024    CREATININE 0.69 07/05/2024    EGFR 88 07/05/2024     Make sure GFR >15 ml/min or dose adjust    LFTs  Lab Results   Component Value Date    ALT 9 05/17/2024    AST 12 05/17/2024    ALKPHOS 97 05/17/2024    BILITOT 0.6 05/17/2024         Assessment/Plan     Patient is experiencing urinary frequency, urgency, and incontinence. Patient is currently not taking any medication for her urinary symptoms  Has tried before none. Was prescribed solifenacin and trospium previously but not started, myrbetriq  - side effect  Discussed both Myrbetriq and Gemtesa with patient. Patient would benefit more from Gemtesa given less side effects  Discussed [facility] PAP with patient, defer for now    Low Dose Vaginal Estrogen  Patient is experiencing vaginal irritation and dryness caused by menopause, urinary symptoms caused by menopause, and vaginal pain   Has tried before estrogen cream - " not sufficient    Reported Benefits of Vaginal Estradiol  Vaginal tissue is more elastic, more secretory and thicker so less easily irritated. This often improves comfort with intercourse and improves sexual function.   Improves urethral and bladder health with a decreased risk of urinary tract infections.    Provided education on MOA, risks/benefits, cautions/warnings and use.   All drugs may cause side effects. However, many people have no side effects or only have minor side effects with low dose vaginal estradiol.   Vaginal estrogens may take up to 12 weeks to alleviate symptoms and if discontinued, the symptoms will return.  There are black box warnings on the vaginal estrogen products that are based off of studies of systemic HT in the WHI study, not studies done on vaginal estradiol.  You may see improvement in as soon as 1 week, but can use vaginal lubricants and moisturizes to help until full benefit is evident.       START  Yuvafem 10mcg tablet - insert vaginally once daily for two weeks, then twice weekly thereafter  Insert the applicator into the vagina as far as comfortably possible, then gently press the plunger until the plunger is fully depressed. This will eject the tablet into the vagina where it will slowly dissolve over a few hours. After removing the applicator dispose of in the trash (DO NOT FLUSH).   STOP estradiol cream      Follow-up: 7/10/2025 1:40 PM      Time spent with pt: Total length of time 30 (minutes) of the encounter and more than 50% was spent counseling the patient.      Jaelyn Goldman PharmD   Meds Clinical Pharmacist  Phone: [phone]     Continue all meds under the continuation of care with the referring provider and clinical pharmacy team.    Verbal consent to manage patient's drug therapy was obtained from the patient. They were informed they may decline to participate or withdraw from participation in pharmacy services at any time.         [1]   Current Outpatient  Medications on File Prior to Visit   Medication Sig Dispense Refill    acetaminophen (Tylenol 8 HOUR) 650 mg ER tablet Take 1 tablet (650 mg) by mouth every 8 hours if needed for mild pain (1 - 3). Do not crush, chew, or split.      albuterol 2.5 mg /3 mL (0.083 %) nebulizer solution Take 3 mL (2.5 mg) by nebulization every 4 hours if needed for wheezing.      aspirin 81 mg EC tablet Take 1 tablet (81 mg) by mouth once daily.      clobetasol (Temovate) 0.05 % ointment Apply topically once daily. Apply to the external vaginal tissues daily for 2 weeks. 30 g 1    coffee xt/phosphatidyl serine (NEURIVA ORIGINAL ORAL) Take 1 tablet by mouth once daily.      diazePAM (Valium) 5 mg tablet Take 1 tablet (5 mg) by mouth as needed at bedtime for muscle spasms for up to 7 days. 7 tablet 0    escitalopram (Lexapro) 10 mg tablet Take 1 tablet (10 mg) by mouth early in the morning..      estradiol (Estrace) 0.01 % (0.1 mg/gram) vaginal cream 1 gram nightly x 2 weeks, then 2 x per week 42.5 g 3    fluticasone propion-salmeteroL (Advair Diskus) 100-50 mcg/dose diskus inhaler Inhale 1 puff twice a day. (Patient taking differently: Inhale 1 puff once daily as needed.)      gabapentin (Neurontin) 100 mg capsule TAKE 1 CAPSULE BY MOUTH DAILY AT BEDTIME FOR 7 DAYS THEN TAKE 2 CAPSULES BY MOUTH DAILY AT BEDTIME FOR 21 DAYS (Patient taking differently: 1 capsule daily)      lubricating eye drops ophthalmic solution Administer 1 drop into both eyes if needed for dry eyes. refresh      mirabegron (Myrbetriq) 50 mg tablet extended release 24 hr 24 hr tablet Take 1 tablet (50 mg) by mouth once daily. 90 tablet 3    olopatadine (Pataday) 0.2 % ophthalmic solution Administer 1 drop into affected eye(s) once daily as needed for allergies.      omeprazole (PriLOSEC) 20 mg DR capsule Take 1 capsule (20 mg) by mouth once daily. (Patient taking differently: Take 1 capsule (20 mg) by mouth once daily as needed (acid reflux).) 90 capsule 2    sodium  chloride (Ocean) 0.65 % nasal spray Administer 1 spray into each nostril if needed for congestion.      vit C/E/Zn/coppr/lutein/zeaxan (EYE HEALTH VITAMIN-MINERAL ORAL) Take 1 tablet by mouth once daily.       No current facility-administered medications on file prior to visit.

## 2025-07-10 ENCOUNTER — APPOINTMENT (OUTPATIENT)
Dept: PHARMACY | Facility: HOSPITAL | Age: 80
End: 2025-07-10
Payer: MEDICARE

## 2025-07-10 NOTE — PROGRESS NOTES
"  Patient ID: Leni rGaham is a 79 y.o. female who presents for OAB     Pt is here for Follow Up appointment.     Referring Provider: Omar Chilel MD  Reason for Referral: OAB, gemtesa assistance    Subjective     Medication and allergy reconciliation completed     Drug Interactions  No relevant drug interactions were noted.    Medication System Management  Patient's preferred pharmacy:     [id number] - [place], [place] - [address]  [address] [facility] [address]  Phone: [phone] Fax: [phone]    Adherence/Organization: no issue at this visit  Affordability/Accessibility: Assess for [facility]. Patient deferred      Medications Ordered Prior to Encounter[1]      Vaginal Symptoms  Vaginal Dryness: no  Dysuria (pain, burning, stinging, or itching with urination): no  Vaginal and/or vulvar irritation/itching: no  Recurrent urinary tract infections: not recently  No results found for: \"ESTRADIOLFRE\", \"PROGESTERONE\", \"ESTRADIOL\", \"FSH\"  Pessary: yes    Current Treatment:   Estradiol cream confirmed using applicator, every other night    Urinary Symptoms  Medications that may contribute to symptoms:   none  Any history of:   Narrow-angle glaucoma: no  Impaired gastric emptying: no, \"diarrhea sometimes, gas pain\"  Urinary retention: no, bladder suspension back in 1988    Prediabetes or diabetes:  no DM dx  Lab Results   Component Value Date    GLUCOSE 100 (H) 07/05/2024    HGBA1C 5.6 08/04/2022    HGBA1C 5.7 09/12/2019    HGBA1C 5.4 12/04/2018     No results found for: \"LEPTIN\", \"INSULFAST\", \"GLUF\"  Frequently of bowel movement: daily  Tobacco use: no  How many protective undergarments are you utilizing daily: 5-6/day  Recommend Coloplast Marlene Protect moisture barrier cream for incontinence associated skin irritation/breakdown.   Work out in the morning, use 2-3 pads at night    What medications have been tried/stopped? " "  Trospium  Solifenacin  Mybetriq - allergies  Current medication? Not taking any meds, pending Gemtesa  When started?   Side effects?   Improvement in symptoms?       Cardiovascular Health  The ASCVD Risk score (Dutch TOMLINSON, et al., 2019) failed to calculate for the following reasons:    The 2019 ASCVD risk score is only valid for ages 40 to 79    Lab Results   Component Value Date    CHOL 258 (H) 09/30/2020     Lab Results   Component Value Date    HDL 65.0 09/30/2020     No results found for: \"LDLCALC\"  Lab Results   Component Value Date    TRIG 137 09/30/2020     No components found for: \"CHOLHDL\"     Vitals  BP Readings from Last 2 Encounters:   05/21/25 120/82   04/01/25 129/83     BMI Readings from Last 1 Encounters:   05/21/25 33.44 kg/m²        BMP  Lab Results   Component Value Date    CALCIUM 9.5 07/05/2024     (L) 07/05/2024    K 4.4 07/05/2024    CO2 28 07/05/2024    CL 97 (L) 07/05/2024    BUN 16 07/05/2024    CREATININE 0.69 07/05/2024    EGFR 88 07/05/2024     Make sure GFR >15 ml/min or dose adjust    LFTs  Lab Results   Component Value Date    ALT 9 05/17/2024    AST 12 05/17/2024    ALKPHOS 97 05/17/2024    BILITOT 0.6 05/17/2024         Assessment/Plan     Low Dose Vaginal Estrogen  Patient was experiencing vaginal irritation and dryness caused by menopause, urinary symptoms caused by menopause, and vaginal pain. Patient has completed 2 weeks of Yuvafem, noted good improvement in symptoms, no side effect, no issue with administration   Has tried before estrogen cream - not sufficient    Reported Benefits of Vaginal Estradiol  Vaginal tissue is more elastic, more secretory and thicker so less easily irritated. This often improves comfort with intercourse and improves sexual function.   Improves urethral and bladder health with a decreased risk of urinary tract infections.    Provided education on MOA, risks/benefits, cautions/warnings and use.   All drugs may cause side effects. However, many " people have no side effects or only have minor side effects with low dose vaginal estradiol.   Vaginal estrogens may take up to 12 weeks to alleviate symptoms and if discontinued, the symptoms will return.  There are black box warnings on the vaginal estrogen products that are based off of studies of systemic HT in the WHI study, not studies done on vaginal estradiol.  You may see improvement in as soon as 1 week, but can use vaginal lubricants and moisturizes to help until full benefit is evident.   Insert the applicator into the vagina as far as comfortably possible, then gently press the plunger until the plunger is fully depressed. This will eject the tablet into the vagina where it will slowly dissolve over a few hours. After removing the applicator dispose of in the trash (DO NOT FLUSH).     Urinary incontinence  Patient is experiencing urinary frequency, urgency, and incontinence. Patient is currently not taking any medication for her urinary symptoms, does not drink pops or iced tea, 1 cup of coffee in the morning and 1 cup of tea in the afternoon. Patient noted waking up once at night with urgency  Was prescribed solifenacin and trospium previously but not started; myrbetriq  - side effect  Discussed Gemtesa, patient is agreeable to start, high copay, offered [facility], patient is eligible but deferred, okay with copay for now.     Gemtesa   Discussed MOA: works by activating beta-3 adrenergic receptors in the bladder resulting in relaxation of the detrusor smooth muscle during the urine storage phase, thus increasing bladder capacity.  Provided education on administration and potential side effects including but not limited to hot flashes <2%, constipation/diarrhea <2%, dry mouth <2%, and headache <4%.   Gemtesa (vibegron) starts working almost immediately - within a few days of first taking it, with noticeable improvements in urinary urgency, frequency, and incontinence noted in clinical trials at 2 weeks  which were reported as significant by 12 weeks.      START  Gemtesa 75 mg once daily.   Continue   Yuvafem 10mcg tablet - insert vaginally twice weekly   STOP estradiol cream      Follow-up: 8/7/2025 9:00 AM      Time spent with pt: Total length of time 30 (minutes) of the encounter and more than 50% was spent counseling the patient.      Jaelyn Goldman, PharmD   Meds Clinical Pharmacist  Phone: [phone]     Continue all meds under the continuation of care with the referring provider and clinical pharmacy team.    Verbal consent to manage patient's drug therapy was obtained from the patient. They were informed they may decline to participate or withdraw from participation in pharmacy services at any time.         [1]   Current Outpatient Medications on File Prior to Visit   Medication Sig Dispense Refill    acetaminophen (Tylenol 8 HOUR) 650 mg ER tablet Take 1 tablet (650 mg) by mouth every 8 hours if needed for mild pain (1 - 3). Do not crush, chew, or split.      albuterol 2.5 mg /3 mL (0.083 %) nebulizer solution Take 3 mL (2.5 mg) by nebulization every 4 hours if needed for wheezing.      aspirin 81 mg EC tablet Take 1 tablet (81 mg) by mouth once daily.      clobetasol (Temovate) 0.05 % ointment Apply topically once daily. Apply to the external vaginal tissues daily for 2 weeks. 30 g 1    coffee xt/phosphatidyl serine (NEURIVA ORIGINAL ORAL) Take 1 tablet by mouth once daily.      diazePAM (Valium) 5 mg tablet Take 1 tablet (5 mg) by mouth as needed at bedtime for muscle spasms for up to 7 days. 7 tablet 0    escitalopram (Lexapro) 10 mg tablet Take 1 tablet (10 mg) by mouth early in the morning..      estradiol (Yuvafem) 10 mcg tablet vaginal tablet Insert 1 tablet (10 mcg) into the vagina once daily for 2 weeks then 2 times a week 14 tablet 1    fluticasone propion-salmeteroL (Advair Diskus) 100-50 mcg/dose diskus inhaler Inhale 1 puff twice a day. (Patient taking differently: Inhale 1 puff once daily  as needed.)      gabapentin (Neurontin) 100 mg capsule TAKE 1 CAPSULE BY MOUTH DAILY AT BEDTIME FOR 7 DAYS THEN TAKE 2 CAPSULES BY MOUTH DAILY AT BEDTIME FOR 21 DAYS (Patient taking differently: 1 capsule daily)      lubricating eye drops ophthalmic solution Administer 1 drop into both eyes if needed for dry eyes. refresh      mirabegron (Myrbetriq) 50 mg tablet extended release 24 hr 24 hr tablet Take 1 tablet (50 mg) by mouth once daily. 90 tablet 3    olopatadine (Pataday) 0.2 % ophthalmic solution Administer 1 drop into affected eye(s) once daily as needed for allergies.      omeprazole (PriLOSEC) 20 mg DR capsule Take 1 capsule (20 mg) by mouth once daily. (Patient taking differently: Take 1 capsule (20 mg) by mouth once daily as needed (acid reflux).) 90 capsule 2    sodium chloride (Ocean) 0.65 % nasal spray Administer 1 spray into each nostril if needed for congestion.      vit C/E/Zn/coppr/lutein/zeaxan (EYE HEALTH VITAMIN-MINERAL ORAL) Take 1 tablet by mouth once daily.       No current facility-administered medications on file prior to visit.

## 2025-07-11 ENCOUNTER — TELEMEDICINE (OUTPATIENT)
Dept: PHARMACY | Facility: HOSPITAL | Age: 80
End: 2025-07-11
Payer: MEDICARE

## 2025-07-11 DIAGNOSIS — N32.81 OAB (OVERACTIVE BLADDER): ICD-10-CM

## 2025-07-11 RX ORDER — VIBEGRON 75 MG/1
75 TABLET, FILM COATED ORAL DAILY
Qty: 30 TABLET | Refills: 3 | Status: SHIPPED | OUTPATIENT
Start: 2025-07-11

## 2025-07-11 RX ORDER — ESTRADIOL 10 UG/1
TABLET, FILM COATED VAGINAL
Qty: 8 TABLET | Refills: 3 | Status: SHIPPED | OUTPATIENT
Start: 2025-07-11

## 2025-08-07 ENCOUNTER — APPOINTMENT (OUTPATIENT)
Dept: PHARMACY | Facility: HOSPITAL | Age: 80
End: 2025-08-07
Payer: MEDICARE

## 2025-08-07 DIAGNOSIS — N32.81 OAB (OVERACTIVE BLADDER): Primary | ICD-10-CM

## 2025-08-07 NOTE — PROGRESS NOTES
"  Patient ID: Leni Graham is a 79 y.o. female who presents for OAB     Pt is here for Follow Up appointment.     Referring Provider: Omar Chilel MD  Reason for Referral: OAB, gemtesa assistance    Subjective     Medication and allergy reconciliation completed     Drug Interactions  No relevant drug interactions were noted.    Medication System Management  Patient's preferred pharmacy:     [id number] - [place], [place] - [address]  [address]  [address] [facility] [address]  Phone: [phone] Fax: [phone]    Adherence/Organization: no issue at this visit  Affordability/Accessibility: Discussed [facility]. Patient deferred      Medications Ordered Prior to Encounter[1]      Vaginal Symptoms  Vaginal Dryness: no  Dysuria (pain, burning, stinging, or itching with urination): no  Vaginal and/or vulvar irritation/itching: no  Recurrent urinary tract infections: not recently  No results found for: \"ESTRADIOLFRE\", \"PROGESTERONE\", \"ESTRADIOL\", \"FSH\"  Pessary: yes    Current Treatment:   Yuvafem 10 mcg twice a week  No side effect noted    Urinary Symptoms  Medications that may contribute to symptoms:   none  Any history of:   Narrow-angle glaucoma: no  Impaired gastric emptying: no, \"diarrhea sometimes, gas pain\"  Urinary retention: no, bladder suspension back in 1988    Prediabetes or diabetes:  no DM dx  Lab Results   Component Value Date    GLUCOSE 100 (H) 07/05/2024    HGBA1C 5.6 08/04/2022    HGBA1C 5.7 09/12/2019    HGBA1C 5.4 12/04/2018     No results found for: \"LEPTIN\", \"INSULFAST\", \"GLUF\"  Frequently of bowel movement: occasional diarrhea  Tobacco use: no  How many protective undergarments are you utilizing daily: 3-4 pads/day when going out  Recommend Coloplast Marlene Protect moisture barrier cream for incontinence associated skin irritation/breakdown.     What medications have been tried/stopped?   Trospium  Solifenacin  Mybetriq - allergies  Current " "medication? Gemtesa 75 mg daily  When started? 8/2025 1 month  Side effects? Mild diarrhea, \"a little warm at night\"  Improvement in symptoms? yes      Cardiovascular Health  The ASCVD Risk score (Dutch TOMLINSON, et al., 2019) failed to calculate for the following reasons:    The 2019 ASCVD risk score is only valid for ages 40 to 79    Lab Results   Component Value Date    CHOL 258 (H) 09/30/2020     Lab Results   Component Value Date    HDL 65.0 09/30/2020     No results found for: \"LDLCALC\"  Lab Results   Component Value Date    TRIG 137 09/30/2020     No components found for: \"CHOLHDL\"     Vitals  BP Readings from Last 2 Encounters:   05/21/25 120/82   04/01/25 129/83     BMI Readings from Last 1 Encounters:   05/21/25 33.44 kg/m²        BMP  Lab Results   Component Value Date    CALCIUM 9.5 07/05/2024     (L) 07/05/2024    K 4.4 07/05/2024    CO2 28 07/05/2024    CL 97 (L) 07/05/2024    BUN 16 07/05/2024    CREATININE 0.69 07/05/2024    EGFR 88 07/05/2024     Make sure GFR >15 ml/min or dose adjust    LFTs  Lab Results   Component Value Date    ALT 9 05/17/2024    AST 12 05/17/2024    ALKPHOS 97 05/17/2024    BILITOT 0.6 05/17/2024         Assessment/Plan     Low Dose Vaginal Estrogen  Patient was experiencing vaginal irritation and dryness caused by menopause, urinary symptoms caused by menopause, and vaginal pain. Patient is on Yuvafem with good improvement  Has tried before estrogen cream - not sufficient    Gemtesa   Patient was experiencing urinary urgency, frequency and incontinence. Patient noted good improvement with Gemtesa, still experienced some incontinence at night time  Taking gemtesa in AM  Discussed common side effect including GI upset, and hot flashes. Advised lifestyle and OTC if needed  Discussed [facility] due to Gemtesa high copay, patient deferred    Continue   Yuvafem 10mcg tablet - insert vaginally twice weekly   Gemtesa 75 mg once daily        Follow-up: 11/10/2025 9:00 AM      Time spent " with pt: Total length of time 15 (minutes) of the encounter and more than 50% was spent counseling the patient.      Jaelyn Goldman PharmD   Meds Clinical Pharmacist  Phone: [phone]     Continue all meds under the continuation of care with the referring provider and clinical pharmacy team.    Verbal consent to manage patient's drug therapy was obtained from the patient. They were informed they may decline to participate or withdraw from participation in pharmacy services at any time.         [1]   Current Outpatient Medications on File Prior to Visit   Medication Sig Dispense Refill    acetaminophen (Tylenol 8 HOUR) 650 mg ER tablet Take 1 tablet (650 mg) by mouth every 8 hours if needed for mild pain (1 - 3). Do not crush, chew, or split.      albuterol 2.5 mg /3 mL (0.083 %) nebulizer solution Take 3 mL (2.5 mg) by nebulization every 4 hours if needed for wheezing.      aspirin 81 mg EC tablet Take 1 tablet (81 mg) by mouth once daily.      clobetasol (Temovate) 0.05 % ointment Apply topically once daily. Apply to the external vaginal tissues daily for 2 weeks. 30 g 1    coffee xt/phosphatidyl serine (NEURIVA ORIGINAL ORAL) Take 1 tablet by mouth once daily.      diazePAM (Valium) 5 mg tablet Take 1 tablet (5 mg) by mouth as needed at bedtime for muscle spasms for up to 7 days. 7 tablet 0    escitalopram (Lexapro) 10 mg tablet Take 1 tablet (10 mg) by mouth early in the morning..      estradiol (Yuvafem) 10 mcg tablet vaginal tablet Insert 1 tablet (10 mcg) into the vagina once daily for 2 weeks then 2 times a week 8 tablet 3    fluticasone propion-salmeteroL (Advair Diskus) 100-50 mcg/dose diskus inhaler Inhale 1 puff twice a day. (Patient taking differently: Inhale 1 puff once daily as needed.)      gabapentin (Neurontin) 100 mg capsule TAKE 1 CAPSULE BY MOUTH DAILY AT BEDTIME FOR 7 DAYS THEN TAKE 2 CAPSULES BY MOUTH DAILY AT BEDTIME FOR 21 DAYS (Patient taking differently: 1 capsule daily)       lubricating eye drops ophthalmic solution Administer 1 drop into both eyes if needed for dry eyes. refresh      olopatadine (Pataday) 0.2 % ophthalmic solution Administer 1 drop into affected eye(s) once daily as needed for allergies.      omeprazole (PriLOSEC) 20 mg DR capsule Take 1 capsule (20 mg) by mouth once daily. (Patient taking differently: Take 1 capsule (20 mg) by mouth once daily as needed (acid reflux).) 90 capsule 2    sodium chloride (Ocean) 0.65 % nasal spray Administer 1 spray into each nostril if needed for congestion.      vibegron (Gemtesa) 75 mg tablet Take 1 tablet (75 mg) by mouth once daily. 30 tablet 3    vit C/E/Zn/coppr/lutein/zeaxan (EYE HEALTH VITAMIN-MINERAL ORAL) Take 1 tablet by mouth once daily.       No current facility-administered medications on file prior to visit.

## 2025-08-08 ENCOUNTER — HOSPITAL ENCOUNTER (OUTPATIENT)
Dept: RADIOLOGY | Facility: CLINIC | Age: 80
Discharge: HOME | End: 2025-08-08
Payer: MEDICARE

## 2025-08-08 DIAGNOSIS — E78.2 MIXED HYPERLIPIDEMIA: ICD-10-CM

## 2025-08-08 PROCEDURE — 75571 CT HRT W/O DYE W/CA TEST: CPT

## 2025-08-19 ENCOUNTER — APPOINTMENT (OUTPATIENT)
Dept: UROLOGY | Facility: CLINIC | Age: 80
End: 2025-08-19
Payer: MEDICARE

## 2025-08-19 DIAGNOSIS — N32.81 OAB (OVERACTIVE BLADDER): Primary | ICD-10-CM

## 2025-08-19 PROCEDURE — 99214 OFFICE O/P EST MOD 30 MIN: CPT | Performed by: STUDENT IN AN ORGANIZED HEALTH CARE EDUCATION/TRAINING PROGRAM

## 2025-08-21 DIAGNOSIS — E78.2 MIXED HYPERLIPIDEMIA: Primary | ICD-10-CM

## 2025-08-21 RX ORDER — ROSUVASTATIN CALCIUM 5 MG/1
5 TABLET, COATED ORAL DAILY
Qty: 30 TABLET | Refills: 11 | Status: SHIPPED | OUTPATIENT
Start: 2025-08-21 | End: 2026-08-21

## 2025-09-07 DIAGNOSIS — E78.2 MIXED HYPERLIPIDEMIA: Primary | ICD-10-CM

## 2025-09-07 RX ORDER — EVOLOCUMAB 140 MG/ML
140 INJECTION, SOLUTION SUBCUTANEOUS
Qty: 6 ML | Refills: 3 | Status: SHIPPED | OUTPATIENT
Start: 2025-09-07 | End: 2026-09-07

## 2025-11-10 ENCOUNTER — APPOINTMENT (OUTPATIENT)
Dept: PHARMACY | Facility: HOSPITAL | Age: 80
End: 2025-11-10
Payer: MEDICARE

## 2026-01-13 ENCOUNTER — APPOINTMENT (OUTPATIENT)
Dept: UROLOGY | Facility: CLINIC | Age: 81
End: 2026-01-13
Payer: MEDICARE